# Patient Record
Sex: FEMALE | Race: WHITE | NOT HISPANIC OR LATINO | Employment: OTHER | URBAN - METROPOLITAN AREA
[De-identification: names, ages, dates, MRNs, and addresses within clinical notes are randomized per-mention and may not be internally consistent; named-entity substitution may affect disease eponyms.]

---

## 2023-02-27 LAB
ALANINE AMINOTRANSFERASE (SGPT) (U/L) IN SER/PLAS: 28 U/L (ref 7–45)
ALBUMIN (G/DL) IN SER/PLAS: 4.1 G/DL (ref 3.4–5)
ALKALINE PHOSPHATASE (U/L) IN SER/PLAS: 46 U/L (ref 33–136)
ANION GAP IN SER/PLAS: 15 MMOL/L (ref 10–20)
ASPARTATE AMINOTRANSFERASE (SGOT) (U/L) IN SER/PLAS: 17 U/L (ref 9–39)
BASOPHILS (10*3/UL) IN BLOOD BY AUTOMATED COUNT: 0.07 X10E9/L (ref 0–0.1)
BASOPHILS/100 LEUKOCYTES IN BLOOD BY AUTOMATED COUNT: 0.7 % (ref 0–2)
BILIRUBIN TOTAL (MG/DL) IN SER/PLAS: 0.9 MG/DL (ref 0–1.2)
CALCIUM (MG/DL) IN SER/PLAS: 9.6 MG/DL (ref 8.6–10.6)
CARBON DIOXIDE, TOTAL (MMOL/L) IN SER/PLAS: 26 MMOL/L (ref 21–32)
CHLORIDE (MMOL/L) IN SER/PLAS: 103 MMOL/L (ref 98–107)
CREATININE (MG/DL) IN SER/PLAS: 0.83 MG/DL (ref 0.5–1.05)
EOSINOPHILS (10*3/UL) IN BLOOD BY AUTOMATED COUNT: 0.16 X10E9/L (ref 0–0.7)
EOSINOPHILS/100 LEUKOCYTES IN BLOOD BY AUTOMATED COUNT: 1.6 % (ref 0–6)
ERYTHROCYTE DISTRIBUTION WIDTH (RATIO) BY AUTOMATED COUNT: 13.6 % (ref 11.5–14.5)
ERYTHROCYTE MEAN CORPUSCULAR HEMOGLOBIN CONCENTRATION (G/DL) BY AUTOMATED: 31.5 G/DL (ref 32–36)
ERYTHROCYTE MEAN CORPUSCULAR VOLUME (FL) BY AUTOMATED COUNT: 98 FL (ref 80–100)
ERYTHROCYTES (10*6/UL) IN BLOOD BY AUTOMATED COUNT: 4.28 X10E12/L (ref 4–5.2)
GFR FEMALE: 76 ML/MIN/1.73M2
GLUCOSE (MG/DL) IN SER/PLAS: 97 MG/DL (ref 74–99)
HEMATOCRIT (%) IN BLOOD BY AUTOMATED COUNT: 41.9 % (ref 36–46)
HEMOGLOBIN (G/DL) IN BLOOD: 13.2 G/DL (ref 12–16)
IMMATURE GRANULOCYTES/100 LEUKOCYTES IN BLOOD BY AUTOMATED COUNT: 0.4 % (ref 0–0.9)
LEUKOCYTES (10*3/UL) IN BLOOD BY AUTOMATED COUNT: 9.9 X10E9/L (ref 4.4–11.3)
LYMPHOCYTES (10*3/UL) IN BLOOD BY AUTOMATED COUNT: 2.67 X10E9/L (ref 1.2–4.8)
LYMPHOCYTES/100 LEUKOCYTES IN BLOOD BY AUTOMATED COUNT: 27.1 % (ref 13–44)
MONOCYTES (10*3/UL) IN BLOOD BY AUTOMATED COUNT: 0.69 X10E9/L (ref 0.1–1)
MONOCYTES/100 LEUKOCYTES IN BLOOD BY AUTOMATED COUNT: 7 % (ref 2–10)
NATRIURETIC PEPTIDE B (PG/ML) IN SER/PLAS: 176 PG/ML (ref 0–99)
NEUTROPHILS (10*3/UL) IN BLOOD BY AUTOMATED COUNT: 6.24 X10E9/L (ref 1.2–7.7)
NEUTROPHILS/100 LEUKOCYTES IN BLOOD BY AUTOMATED COUNT: 63.2 % (ref 40–80)
NRBC (PER 100 WBCS) BY AUTOMATED COUNT: 0 /100 WBC (ref 0–0)
PLATELETS (10*3/UL) IN BLOOD AUTOMATED COUNT: 208 X10E9/L (ref 150–450)
POTASSIUM (MMOL/L) IN SER/PLAS: 3.9 MMOL/L (ref 3.5–5.3)
PROTEIN TOTAL: 6.7 G/DL (ref 6.4–8.2)
SODIUM (MMOL/L) IN SER/PLAS: 140 MMOL/L (ref 136–145)
UREA NITROGEN (MG/DL) IN SER/PLAS: 12 MG/DL (ref 6–23)

## 2023-04-05 LAB
ANION GAP IN SER/PLAS: 14 MMOL/L (ref 10–20)
CALCIUM (MG/DL) IN SER/PLAS: 10.4 MG/DL (ref 8.6–10.6)
CARBON DIOXIDE, TOTAL (MMOL/L) IN SER/PLAS: 29 MMOL/L (ref 21–32)
CHLORIDE (MMOL/L) IN SER/PLAS: 103 MMOL/L (ref 98–107)
CREATININE (MG/DL) IN SER/PLAS: 0.91 MG/DL (ref 0.5–1.05)
ERYTHROCYTE DISTRIBUTION WIDTH (RATIO) BY AUTOMATED COUNT: 13.3 % (ref 11.5–14.5)
ERYTHROCYTE MEAN CORPUSCULAR HEMOGLOBIN CONCENTRATION (G/DL) BY AUTOMATED: 32.2 G/DL (ref 32–36)
ERYTHROCYTE MEAN CORPUSCULAR VOLUME (FL) BY AUTOMATED COUNT: 96 FL (ref 80–100)
ERYTHROCYTES (10*6/UL) IN BLOOD BY AUTOMATED COUNT: 4.35 X10E12/L (ref 4–5.2)
GFR FEMALE: 68 ML/MIN/1.73M2
GLUCOSE (MG/DL) IN SER/PLAS: 115 MG/DL (ref 74–99)
HEMATOCRIT (%) IN BLOOD BY AUTOMATED COUNT: 41.9 % (ref 36–46)
HEMOGLOBIN (G/DL) IN BLOOD: 13.5 G/DL (ref 12–16)
INR IN PPP BY COAGULATION ASSAY: 0.9 (ref 0.9–1.1)
LEUKOCYTES (10*3/UL) IN BLOOD BY AUTOMATED COUNT: 7 X10E9/L (ref 4.4–11.3)
NRBC (PER 100 WBCS) BY AUTOMATED COUNT: 0 /100 WBC (ref 0–0)
PLATELETS (10*3/UL) IN BLOOD AUTOMATED COUNT: 151 X10E9/L (ref 150–450)
POTASSIUM (MMOL/L) IN SER/PLAS: 4.5 MMOL/L (ref 3.5–5.3)
PROTHROMBIN TIME (PT) IN PPP BY COAGULATION ASSAY: 10.5 SEC (ref 9.8–13.4)
SODIUM (MMOL/L) IN SER/PLAS: 141 MMOL/L (ref 136–145)
UREA NITROGEN (MG/DL) IN SER/PLAS: 16 MG/DL (ref 6–23)

## 2023-04-11 ENCOUNTER — HOSPITAL ENCOUNTER (OUTPATIENT)
Dept: DATA CONVERSION | Facility: HOSPITAL | Age: 68
End: 2023-04-11
Attending: INTERNAL MEDICINE | Admitting: INTERNAL MEDICINE
Payer: MEDICARE

## 2023-04-11 DIAGNOSIS — I05.0 RHEUMATIC MITRAL STENOSIS: ICD-10-CM

## 2023-04-11 DIAGNOSIS — I34.2 NONRHEUMATIC MITRAL (VALVE) STENOSIS: ICD-10-CM

## 2023-04-11 DIAGNOSIS — E78.5 HYPERLIPIDEMIA, UNSPECIFIED: ICD-10-CM

## 2023-04-11 DIAGNOSIS — I25.10 ATHEROSCLEROTIC HEART DISEASE OF NATIVE CORONARY ARTERY WITHOUT ANGINA PECTORIS: ICD-10-CM

## 2023-04-11 DIAGNOSIS — J44.9 CHRONIC OBSTRUCTIVE PULMONARY DISEASE, UNSPECIFIED (MULTI): ICD-10-CM

## 2023-04-11 DIAGNOSIS — F17.200 NICOTINE DEPENDENCE, UNSPECIFIED, UNCOMPLICATED: ICD-10-CM

## 2023-04-17 LAB
ACTIVATED PARTIAL THROMBOPLASTIN TIME IN PPP BY COAGULATION ASSAY: 29 SEC (ref 26–39)
ANION GAP IN SER/PLAS: 11 MMOL/L (ref 10–20)
APPEARANCE, URINE: ABNORMAL
BACTERIA, URINE: ABNORMAL /HPF
BASOPHILS (10*3/UL) IN BLOOD BY AUTOMATED COUNT: 0.05 X10E9/L (ref 0–0.1)
BASOPHILS/100 LEUKOCYTES IN BLOOD BY AUTOMATED COUNT: 0.8 % (ref 0–2)
BILIRUBIN, URINE: NEGATIVE
BLOOD, URINE: NEGATIVE
C REACTIVE PROTEIN (MG/L) IN SER/PLAS: 0.14 MG/DL
CALCIUM (MG/DL) IN SER/PLAS: 9.2 MG/DL (ref 8.6–10.3)
CARBON DIOXIDE, TOTAL (MMOL/L) IN SER/PLAS: 30 MMOL/L (ref 21–32)
CHLORIDE (MMOL/L) IN SER/PLAS: 101 MMOL/L (ref 98–107)
COLOR, URINE: YELLOW
CREATININE (MG/DL) IN SER/PLAS: 0.88 MG/DL (ref 0.5–1.05)
EOSINOPHILS (10*3/UL) IN BLOOD BY AUTOMATED COUNT: 0.17 X10E9/L (ref 0–0.7)
EOSINOPHILS/100 LEUKOCYTES IN BLOOD BY AUTOMATED COUNT: 2.7 % (ref 0–6)
ERYTHROCYTE DISTRIBUTION WIDTH (RATIO) BY AUTOMATED COUNT: 13.6 % (ref 11.5–14.5)
ERYTHROCYTE MEAN CORPUSCULAR HEMOGLOBIN CONCENTRATION (G/DL) BY AUTOMATED: 32.1 G/DL (ref 32–36)
ERYTHROCYTE MEAN CORPUSCULAR VOLUME (FL) BY AUTOMATED COUNT: 98 FL (ref 80–100)
ERYTHROCYTES (10*6/UL) IN BLOOD BY AUTOMATED COUNT: 4.18 X10E12/L (ref 4–5.2)
GFR FEMALE: 71 ML/MIN/1.73M2
GLUCOSE (MG/DL) IN SER/PLAS: 87 MG/DL (ref 74–99)
GLUCOSE, URINE: NEGATIVE MG/DL
HEMATOCRIT (%) IN BLOOD BY AUTOMATED COUNT: 40.8 % (ref 36–46)
HEMOGLOBIN (G/DL) IN BLOOD: 13.1 G/DL (ref 12–16)
HYALINE CASTS, URINE: ABNORMAL /LPF
IMMATURE GRANULOCYTES/100 LEUKOCYTES IN BLOOD BY AUTOMATED COUNT: 0.3 % (ref 0–0.9)
INR IN PPP BY COAGULATION ASSAY: 1.1 (ref 0.9–1.1)
KETONES, URINE: ABNORMAL MG/DL
LEUKOCYTE ESTERASE, URINE: ABNORMAL
LEUKOCYTES (10*3/UL) IN BLOOD BY AUTOMATED COUNT: 6.3 X10E9/L (ref 4.4–11.3)
LYMPHOCYTES (10*3/UL) IN BLOOD BY AUTOMATED COUNT: 1.56 X10E9/L (ref 1.2–4.8)
LYMPHOCYTES/100 LEUKOCYTES IN BLOOD BY AUTOMATED COUNT: 24.9 % (ref 13–44)
MONOCYTES (10*3/UL) IN BLOOD BY AUTOMATED COUNT: 0.53 X10E9/L (ref 0.1–1)
MONOCYTES/100 LEUKOCYTES IN BLOOD BY AUTOMATED COUNT: 8.5 % (ref 2–10)
MUCUS, URINE: ABNORMAL /LPF
NEUTROPHILS (10*3/UL) IN BLOOD BY AUTOMATED COUNT: 3.93 X10E9/L (ref 1.2–7.7)
NEUTROPHILS/100 LEUKOCYTES IN BLOOD BY AUTOMATED COUNT: 62.8 % (ref 40–80)
NITRITE, URINE: NEGATIVE
PH, URINE: 5 (ref 5–8)
PLATELETS (10*3/UL) IN BLOOD AUTOMATED COUNT: 181 X10E9/L (ref 150–450)
POTASSIUM (MMOL/L) IN SER/PLAS: 4.4 MMOL/L (ref 3.5–5.3)
PROTEIN, URINE: NEGATIVE MG/DL
PROTHROMBIN TIME (PT) IN PPP BY COAGULATION ASSAY: 12.4 SEC (ref 9.8–13.4)
RBC, URINE: 1 /HPF (ref 0–5)
SODIUM (MMOL/L) IN SER/PLAS: 138 MMOL/L (ref 136–145)
SPECIFIC GRAVITY, URINE: 1.02 (ref 1–1.03)
SQUAMOUS EPITHELIAL CELLS, URINE: 3 /HPF
UREA NITROGEN (MG/DL) IN SER/PLAS: 11 MG/DL (ref 6–23)
UROBILINOGEN, URINE: <2 MG/DL (ref 0–1.9)
WBC, URINE: 3 /HPF (ref 0–5)

## 2023-04-19 LAB
STAPH/MRSA SCREEN, CULTURE: NORMAL
URINE CULTURE: NORMAL

## 2023-05-10 ENCOUNTER — PATIENT OUTREACH (OUTPATIENT)
Dept: CARE COORDINATION | Facility: CLINIC | Age: 68
End: 2023-05-10
Payer: MEDICARE

## 2023-05-12 LAB
INR IN PPP BY COAGULATION ASSAY: 1.4 (ref 0.9–1.1)
PROTHROMBIN TIME (PT) IN PPP BY COAGULATION ASSAY: 15.9 SEC (ref 9.8–13.4)

## 2023-09-07 VITALS — BODY MASS INDEX: 29.84 KG/M2 | HEIGHT: 63 IN | WEIGHT: 168.43 LBS

## 2023-09-14 NOTE — H&P
History & Physical Reviewed:   I have reviewed the History and Physical dated:  03-Apr-2023   History and Physical reviewed and relevant findings noted. Patient examined to review pertinent physical  findings.: No significant changes   Home Medications Reviewed: no changes noted   Allergies Reviewed: no changes noted       Airway/Sedation Assessment:  ·  Emotional Status calm   ·  Neurologic alert & oriented x 3   ·  Respiratory clear to auscultation   ·  Cardiovascular rhythm & rate regular   ·  GI/ soft, nontender     · Pulses present: Pedal Left, Pedal Right, Radial Left, Radial Right    AS [facility] phys assess pulse FT Diminished pedal pulses +1     ·  Mouth Opening OK yes   ·  Neck Flexibility OK yes   ·  Loose Teeth no   ·  Oropharyngeal Classification Class III   ·  ASA PS Classification ASA III   ·  Sedation Plan moderate sedation       ERAS (Enhanced Recovery After Surgery):  ·  ERAS Patient: no     Consent:   COVID-19 Consent:  ·  COVID-19 Risk Consent Surgeon has reviewed key risks related to the risk of alfie COVID-19 and if they contract COVID-19 what the risks are.     Assessment/Plan:   ·  Assessment and Plan    67 Year old female with Mitral valve disease presents for right and Left heart Cath with Dr Miles.      Electronic Signatures:  Karishma Zayas (PAC)  (Signed 11-Apr-2023 07:40)   Authored: History & Physical Reviewed, Airway/Sedation,  ERAS, Consent, Assessment/Plan, Note Completion      Last Updated: 11-Apr-2023 07:40 by Karishma Zayas (PAC)

## 2023-11-29 ENCOUNTER — CLINICAL SUPPORT (OUTPATIENT)
Dept: CARDIOLOGY | Facility: CLINIC | Age: 68
End: 2023-11-29
Payer: MEDICARE

## 2023-11-29 DIAGNOSIS — I34.2 NONRHEUMATIC MITRAL VALVE STENOSIS: ICD-10-CM

## 2023-11-29 DIAGNOSIS — R06.09 DOE (DYSPNEA ON EXERTION): ICD-10-CM

## 2023-11-29 DIAGNOSIS — I34.0 MITRAL VALVE INSUFFICIENCY, UNSPECIFIED ETIOLOGY: ICD-10-CM

## 2023-11-29 LAB
AORTIC VALVE MEAN GRADIENT: 8.4
AORTIC VALVE PEAK VELOCITY: 2
AV PEAK GRADIENT: 16.1
AVA (PEAK VEL): 1.64
AVA (VTI): 1.66
EJECTION FRACTION APICAL 4 CHAMBER: 58
EJECTION FRACTION: 56
LEFT ATRIUM VOLUME AREA LENGTH INDEX BSA: 43.5
LEFT VENTRICLE INTERNAL DIMENSION DIASTOLE: 3.99 (ref 3.5–6)
LEFT VENTRICULAR OUTFLOW TRACT DIAMETER: 1.69
MITRAL VALVE E/A RATIO: 1.09
MITRAL VALVE E/E' RATIO: 25.94
RIGHT VENTRICLE FREE WALL PEAK S': 10
RIGHT VENTRICLE PEAK SYSTOLIC PRESSURE: 27
TRICUSPID ANNULAR PLANE SYSTOLIC EXCURSION: 1.2

## 2023-11-29 PROCEDURE — 93306 TTE W/DOPPLER COMPLETE: CPT | Performed by: STUDENT IN AN ORGANIZED HEALTH CARE EDUCATION/TRAINING PROGRAM

## 2023-11-29 PROCEDURE — 93306 TTE W/DOPPLER COMPLETE: CPT

## 2023-12-13 ENCOUNTER — OFFICE VISIT (OUTPATIENT)
Dept: CARDIOLOGY | Facility: CLINIC | Age: 68
End: 2023-12-13
Payer: MEDICARE

## 2023-12-13 VITALS
DIASTOLIC BLOOD PRESSURE: 60 MMHG | SYSTOLIC BLOOD PRESSURE: 132 MMHG | OXYGEN SATURATION: 98 % | HEIGHT: 63 IN | HEART RATE: 107 BPM | WEIGHT: 171 LBS | BODY MASS INDEX: 30.3 KG/M2

## 2023-12-13 DIAGNOSIS — Z95.1 STATUS POST SINGLE VESSEL CORONARY ARTERY BYPASS: ICD-10-CM

## 2023-12-13 DIAGNOSIS — Z95.2 H/O PROSTHETIC MITRAL VALVE: ICD-10-CM

## 2023-12-13 DIAGNOSIS — I25.10 CORONARY ARTERY DISEASE INVOLVING NATIVE CORONARY ARTERY OF NATIVE HEART WITHOUT ANGINA PECTORIS: Primary | ICD-10-CM

## 2023-12-13 PROCEDURE — 1126F AMNT PAIN NOTED NONE PRSNT: CPT | Performed by: STUDENT IN AN ORGANIZED HEALTH CARE EDUCATION/TRAINING PROGRAM

## 2023-12-13 PROCEDURE — 99215 OFFICE O/P EST HI 40 MIN: CPT | Performed by: STUDENT IN AN ORGANIZED HEALTH CARE EDUCATION/TRAINING PROGRAM

## 2023-12-13 PROCEDURE — 1159F MED LIST DOCD IN RCRD: CPT | Performed by: STUDENT IN AN ORGANIZED HEALTH CARE EDUCATION/TRAINING PROGRAM

## 2023-12-13 RX ORDER — ROSUVASTATIN CALCIUM 40 MG/1
40 TABLET, COATED ORAL DAILY
COMMUNITY
Start: 2023-05-05 | End: 2024-04-30

## 2023-12-13 RX ORDER — AMIODARONE HYDROCHLORIDE 400 MG/1
400 TABLET ORAL DAILY
COMMUNITY
Start: 2023-05-06 | End: 2023-12-13 | Stop reason: ALTCHOICE

## 2023-12-13 RX ORDER — METOPROLOL SUCCINATE 25 MG/1
25 TABLET, EXTENDED RELEASE ORAL DAILY
COMMUNITY
Start: 2023-05-26 | End: 2024-03-21 | Stop reason: DRUGHIGH

## 2023-12-13 RX ORDER — LEVOTHYROXINE SODIUM 25 UG/1
25 TABLET ORAL DAILY
COMMUNITY
End: 2024-04-18 | Stop reason: SDUPTHER

## 2023-12-13 RX ORDER — NAPROXEN SODIUM 220 MG/1
81 TABLET, FILM COATED ORAL DAILY
COMMUNITY
Start: 2023-05-11

## 2023-12-13 ASSESSMENT — ENCOUNTER SYMPTOMS
DEPRESSION: 0
LOSS OF SENSATION IN FEET: 0
OCCASIONAL FEELINGS OF UNSTEADINESS: 0

## 2023-12-13 ASSESSMENT — PAIN SCALES - GENERAL: PAINLEVEL: 0-NO PAIN

## 2023-12-13 NOTE — PROGRESS NOTES
Follow-up (5 month fuv. Echo results. )     HPI:    Elena Patrick is a 68 y.o. female with pertinent history of hypothyroidism, cochlear implant, tobacco abuse, likely underlying coronary artery with coronary calcium score of 507 on CT calcium score performed 6/16/2020, preserved ejection fraction with severe mitral stenosis with peak gradient of 15 mmHg, severe left atrial enlargement with mild to moderate mitral regurgitation, mild to moderate pulmonary hypertension with RVSP of 43 mmHg on echo performed 12/6/2022, moderate 70% mid LAD lesion, pulmonary artery pressure of 35 to 24 mmHg, pulmonary wedge pressure of 24 mmHg, LVEDP of 14 mmHg, severe mitral stenosis and cardiac catheterization performed 4/11/2023, status post Mitral valve replacement #29 Epic, CABG LIMA to LAD, PFO closure, LA atrial appendage atrial clip, preserved ejection fraction with restrictive diastolic dysfunction, moderate left atrial enlargement, bioprosthetic mitral valve with 5 mm gradient, mildly elevated RVSP on echo performed 5/5/2023, preserved LVEF, impaired relaxation, mildly reduced right ventricular function, moderate to severe left atrial enlargement, left atrial pended clip, moderate mitral annular calcification, prosthetic #29 epic mitral valve with gradients of 16.4/7.8 millimeters of mercury with an overall reduction RVSP on echo performed 11/29/2023 presents to cardiology clinic for follow-up.     She is doing relatively well.  Cardiac rehab is done well and she is completed.  She has had no significant chest discomfort.  Dyspnea on exertion is stable.  We reviewed and discussed her recent echocardiogram.  No exacerbating or relieving factors.  Patient denies chest pain and angina.  Pt denies orthopnea, and paroxysmal nocturnal dyspnea.  Pt denies worsening lower extremity edema.  Pt denies palpitations or syncope.  No recent falls.  No fever or chills.  No cough.  No change in bowel or bladder habits.  No sick contacts.   No recent travel.    12 point review of systems including (Constitutional, Eyes, ENMT, Respiratory, Cardiac, Gastrointestinal, Neurological, Psychiatric, and Hematologic) was performed and is otherwise negative.    Past medical history reviewed:   has a past medical history of Personal history of (healed) traumatic fracture (09/23/2021), Personal history of other diseases of the musculoskeletal system and connective tissue (11/01/2019), Personal history of other endocrine, nutritional and metabolic disease (11/01/2019), and Personal history of other infectious and parasitic diseases (11/01/2019).    Past surgical history reviewed:   has a past surgical history that includes Other surgical history (11/01/2019).    Social history reviewed:   reports that she quit smoking about 11 months ago. Her smoking use included cigarettes. She has never used smokeless tobacco. No history on file for alcohol use and drug use.     Family history:  No sudden cardiac death or premature coronary artery disease.     Allergies reviewed: Azithromycin, Codeine, Penicillins, Apixaban, Atorvastatin, Erythromycin, Prednisone, and Clindamycin     Medications reviewed:   Current Outpatient Medications   Medication Instructions    aspirin (ASPIRIN CHILDRENS) 81 mg, oral, Daily    levothyroxine (SYNTHROID, LEVOXYL) 25 mcg, oral, Daily    metoprolol succinate XL (TOPROL-XL) 25 mg, oral, Daily    MULTIVITAMIN ORAL 1 tablet, oral, Daily RT    rosuvastatin (CRESTOR) 40 mg, oral, Daily        Vitals reviewed: Visit Vitals  /60 (Patient Position: Sitting)   Pulse 107       Physical Exam:   General:  Patient is awake, alert, and oriented.  Patient is in no acute distress.  HEENT:  Pupils equal and reactive.  Normocephalic.  Moist mucosa.    Neck:  No thyromegaly.  Normal Jugular Venous Pressure.  Cardiovascular:  Regular rate and rhythm.  Normal S1 and S2.  1/6 TRICIA.  Midline scar.  Pulmonary:  Clear to auscultation bilaterally.  Abdomen:  Soft.  Non-tender.   Non-distended.  Positive bowel sounds.  Lower Extremities:  2+ pedal pulses. No LE edema.  Neurologic:  Cranial nerves intact.  No focal deficit.   Skin: Skin warm and dry, normal skin turgor.   Psychiatric: Normal affect.    Last Labs:  CBC -      Lab Results   Component Value Date    WBC 10.1 05/10/2023    HGB 8.8 (L) 05/10/2023    HCT 27.6 (L) 05/10/2023     05/10/2023        CMP-  Lab Results   Component Value Date    GLUCOSE 148 (H) 05/10/2023     (L) 05/10/2023    K 3.3 (L) 05/10/2023    CL 96 (L) 05/10/2023    CO2 28 05/10/2023    ANIONGAP 14 05/10/2023    BUN 10 05/10/2023    CREATININE 0.90 05/10/2023    CALCIUM 9.2 05/10/2023    PHOS 2.7 05/06/2023    PROT 6.3 (L) 05/10/2023    ALBUMIN 3.7 05/10/2023    AST 48 (H) 05/10/2023     (H) 05/10/2023    ALKPHOS 69 05/10/2023    BILITOT 0.6 05/10/2023        LIPIDS-  Lab Results   Component Value Date    CHOL 128 06/24/2020    TRIG 199 (H) 06/24/2020    HDL 49.9 06/24/2020    CHHDL 2.6 06/24/2020    VLDL 40 06/24/2020        OTHERS-  Lab Results   Component Value Date    HGBA1C 5.6 06/22/2023     (H) 02/27/2023        I personally reviewed the patient's recent vitals, labs, medications, orders, EKGs, pertinent cardiac imaging/ echocardiography and ischemic evaluations including stress testing/ cardiac catheterization.    Assessment and Plan:    Elena Patrick is a 68 y.o. female with pertinent history of hypothyroidism, cochlear implant, tobacco abuse, likely underlying coronary artery with coronary calcium score of 507 on CT calcium score performed 6/16/2020, preserved ejection fraction with severe mitral stenosis with peak gradient of 15 mmHg, severe left atrial enlargement with mild to moderate mitral regurgitation, mild to moderate pulmonary hypertension with RVSP of 43 mmHg on echo performed 12/6/2022, moderate 70% mid LAD lesion, pulmonary artery pressure of 35 to 24 mmHg, pulmonary wedge pressure of 24 mmHg, LVEDP  of 14 mmHg, severe mitral stenosis and cardiac catheterization performed 4/11/2023, status post Mitral valve replacement #29 Epic, CABG LIMA to LAD, PFO closure, LA atrial appendage atrial clip, preserved ejection fraction with restrictive diastolic dysfunction, moderate left atrial enlargement, bioprosthetic mitral valve with 5 mm gradient, mildly elevated RVSP on echo performed 5/5/2023, preserved LVEF, impaired relaxation, mildly reduced right ventricular function, moderate to severe left atrial enlargement, left atrial pended clip, moderate mitral annular calcification, prosthetic #29 epic mitral valve with gradients of 16.4/7.8 millimeters of mercury with an overall reduction RVSP on echo performed 11/29/2023 presents to cardiology clinic for follow-up. She is doing relatively well.  Cardiac rehab is done well and she is completed.  She has had no significant chest discomfort.  Dyspnea on exertion is stable.  We reviewed and discussed her recent echocardiogram.      Please continue current cardiac medications including aspirin 81 mg, Toprol succinate 25 mg daily, rosuvastatin 40 mg daily.      Please followup with me in Cardiology clinic within the next 7 months.  Please return to clinic sooner or seek emergent care if your symptoms reoccur or worsen.    Thank you for allowing me to participate in their care.  Please feel free to call me with any further questions or concerns.        Ben Martinez MD, FACC, STEFANIE WILEY  Division of Cardiovascular Medicine  Medical Director, [facility]  [facility]  Assistant Clinical Professor, Medicine  [facility] [employer] [facility] [employer] [facility] [employer]  [email or web address]  Office:  [phone]

## 2023-12-13 NOTE — PATIENT INSTRUCTIONS
Please continue current cardiac medications including aspirin 81 mg, Toprol succinate 25 mg daily, rosuvastatin 40 mg daily.      Please followup with me in Cardiology clinic within the next 7 months.  Please return to clinic sooner or seek emergent care if your symptoms reoccur or worsen.

## 2024-03-21 ENCOUNTER — OFFICE VISIT (OUTPATIENT)
Dept: CARDIOLOGY | Facility: CLINIC | Age: 69
End: 2024-03-21
Payer: MEDICARE

## 2024-03-21 ENCOUNTER — ANCILLARY PROCEDURE (OUTPATIENT)
Dept: CARDIOLOGY | Facility: CLINIC | Age: 69
End: 2024-03-21
Payer: MEDICARE

## 2024-03-21 VITALS
BODY MASS INDEX: 29.77 KG/M2 | WEIGHT: 168 LBS | SYSTOLIC BLOOD PRESSURE: 126 MMHG | HEIGHT: 63 IN | HEART RATE: 77 BPM | DIASTOLIC BLOOD PRESSURE: 64 MMHG

## 2024-03-21 DIAGNOSIS — R06.09 DOE (DYSPNEA ON EXERTION): ICD-10-CM

## 2024-03-21 DIAGNOSIS — I34.0 MITRAL VALVE INSUFFICIENCY, UNSPECIFIED ETIOLOGY: ICD-10-CM

## 2024-03-21 DIAGNOSIS — Z95.2 H/O PROSTHETIC MITRAL VALVE: ICD-10-CM

## 2024-03-21 DIAGNOSIS — R00.2 PALPITATIONS: Primary | ICD-10-CM

## 2024-03-21 DIAGNOSIS — I34.2 NONRHEUMATIC MITRAL VALVE STENOSIS: ICD-10-CM

## 2024-03-21 DIAGNOSIS — R00.2 PALPITATIONS: ICD-10-CM

## 2024-03-21 DIAGNOSIS — I25.10 CORONARY ARTERY DISEASE INVOLVING NATIVE CORONARY ARTERY OF NATIVE HEART WITHOUT ANGINA PECTORIS: ICD-10-CM

## 2024-03-21 DIAGNOSIS — Z95.1 STATUS POST SINGLE VESSEL CORONARY ARTERY BYPASS: ICD-10-CM

## 2024-03-21 PROCEDURE — 1159F MED LIST DOCD IN RCRD: CPT | Performed by: STUDENT IN AN ORGANIZED HEALTH CARE EDUCATION/TRAINING PROGRAM

## 2024-03-21 PROCEDURE — 93247 EXT ECG>7D<15D SCAN A/R: CPT

## 2024-03-21 PROCEDURE — 93248 EXT ECG>7D<15D REV&INTERPJ: CPT | Performed by: INTERNAL MEDICINE

## 2024-03-21 PROCEDURE — 99215 OFFICE O/P EST HI 40 MIN: CPT | Performed by: STUDENT IN AN ORGANIZED HEALTH CARE EDUCATION/TRAINING PROGRAM

## 2024-03-21 PROCEDURE — 1036F TOBACCO NON-USER: CPT | Performed by: STUDENT IN AN ORGANIZED HEALTH CARE EDUCATION/TRAINING PROGRAM

## 2024-03-21 RX ORDER — METOPROLOL SUCCINATE 50 MG/1
50 TABLET, EXTENDED RELEASE ORAL DAILY
Qty: 90 TABLET | Refills: 3 | Status: SHIPPED | OUTPATIENT
Start: 2024-03-21 | End: 2024-03-28 | Stop reason: DRUGHIGH

## 2024-03-21 NOTE — PROGRESS NOTES
HPI:    Elena Patrick is a 68 y.o. female with pertinent history of hypothyroidism, cochlear implant, tobacco abuse, likely underlying coronary artery with coronary calcium score of 507 on CT calcium score performed 6/16/2020, preserved ejection fraction with severe mitral stenosis with peak gradient of 15 mmHg, severe left atrial enlargement with mild to moderate mitral regurgitation, mild to moderate pulmonary hypertension with RVSP of 43 mmHg on echo performed 12/6/2022, moderate 70% mid LAD lesion, pulmonary artery pressure of 35 to 24 mmHg, pulmonary wedge pressure of 24 mmHg, LVEDP of 14 mmHg, severe mitral stenosis and cardiac catheterization performed 4/11/2023, status post Mitral valve replacement #29 Epic, CABG LIMA to LAD, PFO closure, LA atrial appendage atrial clip, preserved ejection fraction with restrictive diastolic dysfunction, moderate left atrial enlargement, bioprosthetic mitral valve with 5 mm gradient, mildly elevated RVSP on echo performed 5/5/2023, preserved LVEF, impaired relaxation, mildly reduced right ventricular function, moderate to severe left atrial enlargement, left atrial pended clip, moderate mitral annular calcification, prosthetic #29 epic mitral valve with gradients of 16.4/7.8 millimeters of mercury with an overall reduction RVSP on echo performed 11/29/2023 presents to cardiology clinic for follow-up.     She is doing relatively well but does notice significant increase in palpitations.  They can occur at rest or with stress and have increased a bit in frequency especially since surgery.  She has had no significant chest discomfort.  Dyspnea on exertion is stable.  We reviewed and discussed her recent echocardiogram.  No exacerbating or relieving factors.  Patient denies chest pain and angina.  Pt denies orthopnea, and paroxysmal nocturnal dyspnea.  Pt denies worsening lower extremity edema.  Pt denies syncope.  No recent falls.  No fever or chills.  No cough.  No  change in bowel or bladder habits.  No sick contacts.  No recent travel.    12 point review of systems including (Constitutional, Eyes, ENMT, Respiratory, Cardiac, Gastrointestinal, Neurological, Psychiatric, and Hematologic) was performed and is otherwise negative.    Past medical history reviewed:   has a past medical history of Personal history of (healed) traumatic fracture (09/23/2021), Personal history of other diseases of the musculoskeletal system and connective tissue (11/01/2019), Personal history of other endocrine, nutritional and metabolic disease (11/01/2019), and Personal history of other infectious and parasitic diseases (11/01/2019).    Past surgical history reviewed:   has a past surgical history that includes Other surgical history (11/01/2019).    Social history reviewed:   reports that she quit smoking about 14 months ago. Her smoking use included cigarettes. She has never used smokeless tobacco. No history on file for alcohol use and drug use.     Family history:  No sudden cardiac death or premature coronary artery disease.     Allergies reviewed: Azithromycin, Codeine, Penicillins, Apixaban, Atorvastatin, Erythromycin, Prednisone, and Clindamycin     Medications reviewed:   Current Outpatient Medications   Medication Instructions    aspirin (ASPIRIN CHILDRENS) 81 mg, oral, Daily    levothyroxine (SYNTHROID, LEVOXYL) 25 mcg, oral, Daily    metoprolol succinate XL (TOPROL-XL) 25 mg, oral, Daily    MULTIVITAMIN ORAL 1 tablet, oral, Daily RT    rosuvastatin (CRESTOR) 40 mg, oral, Daily        Vitals reviewed: Visit Vitals  /64   Pulse 77       Physical Exam:   General:  Patient is awake, alert, and oriented.  Patient is in no acute distress.  HEENT:  Pupils equal and reactive.  Normocephalic.  Moist mucosa.    Neck:  No thyromegaly.  Normal Jugular Venous Pressure.  Cardiovascular:  Regular rate and rhythm.  Normal S1 and S2.  1/6 TRICIA.  Midline scar.  Pulmonary:  Clear to auscultation  bilaterally.  Abdomen:  Soft. Non-tender.   Non-distended.  Positive bowel sounds.  Lower Extremities:  2+ pedal pulses. No LE edema.  Neurologic:  Cranial nerves intact.  No focal deficit.   Skin: Skin warm and dry, normal skin turgor.   Psychiatric: Normal affect.    Last Labs:  CBC -      Lab Results   Component Value Date    WBC 10.1 05/10/2023    HGB 8.8 (L) 05/10/2023    HCT 27.6 (L) 05/10/2023     05/10/2023        CMP-  Lab Results   Component Value Date    GLUCOSE 148 (H) 05/10/2023     (L) 05/10/2023    K 3.3 (L) 05/10/2023    CL 96 (L) 05/10/2023    CO2 28 05/10/2023    ANIONGAP 14 05/10/2023    BUN 10 05/10/2023    CREATININE 0.90 05/10/2023    CALCIUM 9.2 05/10/2023    PHOS 2.7 05/06/2023    PROT 6.3 (L) 05/10/2023    ALBUMIN 3.7 05/10/2023    AST 48 (H) 05/10/2023     (H) 05/10/2023    ALKPHOS 69 05/10/2023    BILITOT 0.6 05/10/2023        LIPIDS-  Lab Results   Component Value Date    CHOL 128 06/24/2020    TRIG 199 (H) 06/24/2020    HDL 49.9 06/24/2020    CHHDL 2.6 06/24/2020    VLDL 40 06/24/2020        OTHERS-  Lab Results   Component Value Date    HGBA1C 5.6 06/22/2023     (H) 02/27/2023        I personally reviewed the patient's recent vitals, labs, medications, orders, EKGs, pertinent cardiac imaging/ echocardiography and ischemic evaluations including stress testing/ cardiac catheterization.    Assessment and Plan:    Elena Patrick is a 68 y.o. female with pertinent history of hypothyroidism, cochlear implant, tobacco abuse, likely underlying coronary artery with coronary calcium score of 507 on CT calcium score performed 6/16/2020, preserved ejection fraction with severe mitral stenosis with peak gradient of 15 mmHg, severe left atrial enlargement with mild to moderate mitral regurgitation, mild to moderate pulmonary hypertension with RVSP of 43 mmHg on echo performed 12/6/2022, moderate 70% mid LAD lesion, pulmonary artery pressure of 35 to 24 mmHg, pulmonary  wedge pressure of 24 mmHg, LVEDP of 14 mmHg, severe mitral stenosis and cardiac catheterization performed 4/11/2023, status post Mitral valve replacement #29 Epic, CABG LIMA to LAD, PFO closure, LA atrial appendage atrial clip, preserved ejection fraction with restrictive diastolic dysfunction, moderate left atrial enlargement, bioprosthetic mitral valve with 5 mm gradient, mildly elevated RVSP on echo performed 5/5/2023, preserved LVEF, impaired relaxation, mildly reduced right ventricular function, moderate to severe left atrial enlargement, left atrial pended clip, moderate mitral annular calcification, prosthetic #29 epic mitral valve with gradients of 16.4/7.8 millimeters of mercury with an overall reduction RVSP on echo performed 11/29/2023 presents to cardiology clinic for follow-up. She is doing relatively well but does notice significant increase in palpitations.  They can occur at rest or with stress and have increased a bit in frequency especially since surgery.  She has had no significant chest discomfort.  Dyspnea on exertion is stable.  We reviewed and discussed her recent echocardiogram.     Given the patient's symptoms and in order to further evaluate possible arrhythmias, we will plan to perform an event monitor.    Will increase metoprolol succinate from 25 mg daily to 50 mg daily.    Please continue current cardiac medications including aspirin 81 mg, rosuvastatin 40 mg daily.      Please followup with me in Cardiology clinic within the next 2 months.  Please return to clinic sooner or seek emergent care if your symptoms reoccur or worsen.    Thank you for allowing me to participate in their care.  Please feel free to call me with any further questions or concerns.        Ben Martinez MD, FACANGELIA, STEFANIE WILEY  Division of Cardiovascular Medicine  Medical Director, [facility]  [facility]  Assistant Clinical Professor,  Medicine  [employer]  [email or web address]  Office:  [phone]

## 2024-03-21 NOTE — PATIENT INSTRUCTIONS
We will plan to perform an event monitor.    Will increase metoprolol succinate from 25 mg daily to 50 mg daily.    Please continue current cardiac medications including aspirin 81 mg, rosuvastatin 40 mg daily.      Please followup with me in Cardiology clinic within the next 2 months.  Please return to clinic sooner or seek emergent care if your symptoms reoccur or worsen.

## 2024-03-28 ENCOUNTER — OFFICE VISIT (OUTPATIENT)
Dept: CARDIOLOGY | Facility: CLINIC | Age: 69
End: 2024-03-28
Payer: MEDICARE

## 2024-03-28 VITALS
BODY MASS INDEX: 30.3 KG/M2 | OXYGEN SATURATION: 98 % | HEIGHT: 63 IN | DIASTOLIC BLOOD PRESSURE: 80 MMHG | WEIGHT: 171 LBS | HEART RATE: 73 BPM | SYSTOLIC BLOOD PRESSURE: 138 MMHG

## 2024-03-28 DIAGNOSIS — I25.10 CORONARY ARTERY DISEASE INVOLVING NATIVE CORONARY ARTERY OF NATIVE HEART WITHOUT ANGINA PECTORIS: ICD-10-CM

## 2024-03-28 DIAGNOSIS — Z95.2 H/O PROSTHETIC MITRAL VALVE: ICD-10-CM

## 2024-03-28 DIAGNOSIS — R06.09 DOE (DYSPNEA ON EXERTION): ICD-10-CM

## 2024-03-28 DIAGNOSIS — Z95.1 STATUS POST SINGLE VESSEL CORONARY ARTERY BYPASS: ICD-10-CM

## 2024-03-28 DIAGNOSIS — I34.2 NONRHEUMATIC MITRAL VALVE STENOSIS: ICD-10-CM

## 2024-03-28 DIAGNOSIS — I34.0 MITRAL VALVE INSUFFICIENCY, UNSPECIFIED ETIOLOGY: ICD-10-CM

## 2024-03-28 DIAGNOSIS — R00.2 PALPITATIONS: Primary | ICD-10-CM

## 2024-03-28 PROCEDURE — 1159F MED LIST DOCD IN RCRD: CPT | Performed by: STUDENT IN AN ORGANIZED HEALTH CARE EDUCATION/TRAINING PROGRAM

## 2024-03-28 PROCEDURE — 99214 OFFICE O/P EST MOD 30 MIN: CPT | Performed by: STUDENT IN AN ORGANIZED HEALTH CARE EDUCATION/TRAINING PROGRAM

## 2024-03-28 RX ORDER — METOPROLOL SUCCINATE 25 MG/1
25 TABLET, EXTENDED RELEASE ORAL DAILY
Qty: 90 TABLET | Refills: 3 | Status: SHIPPED | OUTPATIENT
Start: 2024-03-28 | End: 2024-04-30

## 2024-03-28 NOTE — PROGRESS NOTES
HPI:    Elena Patrick is a 68 y.o. female with pertinent history of hypothyroidism, cochlear implant, tobacco abuse, likely underlying coronary artery with coronary calcium score of 507 on CT calcium score performed 6/16/2020, preserved ejection fraction with severe mitral stenosis with peak gradient of 15 mmHg, severe left atrial enlargement with mild to moderate mitral regurgitation, mild to moderate pulmonary hypertension with RVSP of 43 mmHg on echo performed 12/6/2022, moderate 70% mid LAD lesion, pulmonary artery pressure of 35 to 24 mmHg, pulmonary wedge pressure of 24 mmHg, LVEDP of 14 mmHg, severe mitral stenosis and cardiac catheterization performed 4/11/2023, status post Mitral valve replacement #29 Epic, CABG LIMA to LAD, PFO closure, LA atrial appendage atrial clip, preserved ejection fraction with restrictive diastolic dysfunction, moderate left atrial enlargement, bioprosthetic mitral valve with 5 mm gradient, mildly elevated RVSP on echo performed 5/5/2023, preserved LVEF, impaired relaxation, mildly reduced right ventricular function, moderate to severe left atrial enlargement, left atrial pended clip, moderate mitral annular calcification, prosthetic #29 epic mitral valve with gradients of 16.4/7.8 millimeters of mercury with an overall reduction RVSP on echo performed 11/29/2023 presents to cardiology clinic for follow-up.     She is doing relatively well but felt extremely fatigued and increased shortness of breath on increased dose of metoprolol succinate 50 mg.  She has subsequently gone back down on the dose of 25 mg and is stable.  Palpitations may be slightly improved but she is still wearing her event monitor.   Dyspnea on exertion is stable.  We reviewed and discussed her recent echocardiogram.  No exacerbating or relieving factors.  Patient denies chest pain and angina.  Pt denies orthopnea, and paroxysmal nocturnal dyspnea.  Pt denies worsening lower extremity edema.  Pt denies  syncope.  No recent falls.  No fever or chills.  No cough.  No change in bowel or bladder habits.  No sick contacts.  No recent travel.    12 point review of systems including (Constitutional, Eyes, ENMT, Respiratory, Cardiac, Gastrointestinal, Neurological, Psychiatric, and Hematologic) was performed and is otherwise negative.    Past medical history reviewed:   has a past medical history of Personal history of (healed) traumatic fracture (09/23/2021), Personal history of other diseases of the musculoskeletal system and connective tissue (11/01/2019), Personal history of other endocrine, nutritional and metabolic disease (11/01/2019), and Personal history of other infectious and parasitic diseases (11/01/2019).    Past surgical history reviewed:   has a past surgical history that includes Other surgical history (11/01/2019).    Social history reviewed:   reports that she quit smoking about 14 months ago. Her smoking use included cigarettes. She has never used smokeless tobacco. No history on file for alcohol use and drug use.     Family history:  No sudden cardiac death or premature coronary artery disease.     Allergies reviewed: Azithromycin, Codeine, Penicillins, Apixaban, Atorvastatin, Erythromycin, Prednisone, and Clindamycin     Medications reviewed:   Current Outpatient Medications   Medication Instructions    aspirin (ASPIRIN CHILDRENS) 81 mg, oral, Daily    levothyroxine (SYNTHROID, LEVOXYL) 25 mcg, oral, Daily    metoprolol succinate XL (TOPROL-XL) 50 mg, oral, Daily    MULTIVITAMIN ORAL 1 tablet, oral, Daily RT    rosuvastatin (CRESTOR) 40 mg, oral, Daily        Vitals reviewed: Visit Vitals  /80   Pulse 73       Physical Exam:   General:  Patient is awake, alert, and oriented.  Patient is in no acute distress.  HEENT:  Pupils equal and reactive.  Normocephalic.  Moist mucosa.    Neck:  No thyromegaly.  Normal Jugular Venous Pressure.  Cardiovascular:  Regular rate and rhythm.  Normal S1 and S2.   1/6 TRICIA.  Midline scar.  Pulmonary:  Clear to auscultation bilaterally.  Abdomen:  Soft. Non-tender.   Non-distended.  Positive bowel sounds.  Lower Extremities:  2+ pedal pulses. No LE edema.  Neurologic:  Cranial nerves intact.  No focal deficit.   Skin: Skin warm and dry, normal skin turgor.   Psychiatric: Normal affect.    Last Labs:  CBC -      Lab Results   Component Value Date    WBC 10.1 05/10/2023    HGB 8.8 (L) 05/10/2023    HCT 27.6 (L) 05/10/2023     05/10/2023        CMP-  Lab Results   Component Value Date    GLUCOSE 148 (H) 05/10/2023     (L) 05/10/2023    K 3.3 (L) 05/10/2023    CL 96 (L) 05/10/2023    CO2 28 05/10/2023    ANIONGAP 14 05/10/2023    BUN 10 05/10/2023    CREATININE 0.90 05/10/2023    CALCIUM 9.2 05/10/2023    PHOS 2.7 05/06/2023    PROT 6.3 (L) 05/10/2023    ALBUMIN 3.7 05/10/2023    AST 48 (H) 05/10/2023     (H) 05/10/2023    ALKPHOS 69 05/10/2023    BILITOT 0.6 05/10/2023        LIPIDS-  Lab Results   Component Value Date    CHOL 128 06/24/2020    TRIG 199 (H) 06/24/2020    HDL 49.9 06/24/2020    CHHDL 2.6 06/24/2020    VLDL 40 06/24/2020        OTHERS-  Lab Results   Component Value Date    HGBA1C 5.6 06/22/2023     (H) 02/27/2023        I personally reviewed the patient's recent vitals, labs, medications, orders, EKGs, pertinent cardiac imaging/ echocardiography and ischemic evaluations including stress testing/ cardiac catheterization.    Assessment and Plan:    Elena Patrick is a 68 y.o. female with pertinent history of hypothyroidism, cochlear implant, tobacco abuse, likely underlying coronary artery with coronary calcium score of 507 on CT calcium score performed 6/16/2020, preserved ejection fraction with severe mitral stenosis with peak gradient of 15 mmHg, severe left atrial enlargement with mild to moderate mitral regurgitation, mild to moderate pulmonary hypertension with RVSP of 43 mmHg on echo performed 12/6/2022, moderate 70% mid LAD lesion,  pulmonary artery pressure of 35 to 24 mmHg, pulmonary wedge pressure of 24 mmHg, LVEDP of 14 mmHg, severe mitral stenosis and cardiac catheterization performed 4/11/2023, status post Mitral valve replacement #29 Epic, CABG LIMA to LAD, PFO closure, LA atrial appendage atrial clip, preserved ejection fraction with restrictive diastolic dysfunction, moderate left atrial enlargement, bioprosthetic mitral valve with 5 mm gradient, mildly elevated RVSP on echo performed 5/5/2023, preserved LVEF, impaired relaxation, mildly reduced right ventricular function, moderate to severe left atrial enlargement, left atrial pended clip, moderate mitral annular calcification, prosthetic #29 epic mitral valve with gradients of 16.4/7.8 millimeters of mercury with an overall reduction RVSP on echo performed 11/29/2023 presents to cardiology clinic for follow-up.  She is doing relatively well but felt extremely fatigued and increased shortness of breath on increased dose of metoprolol succinate 50 mg.  She has subsequently gone back down on the dose of 25 mg and is stable.  Palpitations may be slightly improved but she is still wearing her event monitor.       We will follow-up on your event monitor results.    Will reduce metoprolol succinate from 50 mg daily to 25 mg daily.    Please continue current cardiac medications including aspirin 81 mg, rosuvastatin 40 mg daily.      Please followup with me in Cardiology clinic as scheduled.  Please return to clinic sooner or seek emergent care if your symptoms reoccur or worsen.    Thank you for allowing me to participate in their care.  Please feel free to call me with any further questions or concerns.        Ben Martinez MD, FACC, STEFANIE WILEY  Division of Cardiovascular Medicine  System Director, Nuclear Cardiology   Medical Director, [facility] [place], [facility]   Clinical , [facility]  [facility]  [email or web address]   Office:  [phone]

## 2024-03-28 NOTE — PATIENT INSTRUCTIONS
We will follow-up on your event monitor results.    Will reduce metoprolol succinate from 50 mg daily to 25 mg daily.    Please continue current cardiac medications including aspirin 81 mg, rosuvastatin 40 mg daily.      Please followup with me in Cardiology clinic as scheduled.  Please return to clinic sooner or seek emergent care if your symptoms reoccur or worsen.

## 2024-04-03 NOTE — PROGRESS NOTES
"ADULT AUDIOMETRIC EVALUATION      Name:  Elena Patrick  :  1955  Age:  68 y.o.  Date of Evaluation:  2024    IMPRESSIONS     Today's test results are consistent with severe to profound SNHL in the LE and profound SNHL in the RE. Discussed results and recommendations with patient.  Questions were addressed and the patient was encouraged to contact our department should concerns arise.    RECOMMENDATIONS     Continue medical follow up with PCP/ENT.  Return for evaluation following any medical management.     Time: 8319-2797    HISTORY     Elena Patrick is seen today in conjunction with ENT appointment.  Patient reported history of left ear cochlear implantation in . Most recently managed by [facility], however would like to establish care at . Currently wears a MED-Youjia processor which she perceives as helpful however notes she utilizes a combination of auditory and visual cues (lipreading) for understanding. She has \"no hearing\" in her right ear at this time and does not use amplification on that side. Most recently she perceives her hearing diminished since having open heart surgery in May 2023. She also has intermittent bothersome tinnitus described as a \"hum\" which can come with tinny/echoes in voices. Previous history of imbalance, however no reported recent falls.    TEST RESULTS     Otoscopic Evaluation:  Physical exam to evaluate the outer ear  Right Ear: Clear ear canal.  Left Ear: Clear ear canal.    Tympanometry & Acoustic Reflexes:  Assesses the function of the middle ear and inner ear structures  Right Ear: Tympanometry revealed normal ear canal volume, peak pressure, and compliance, consistent with normal middle ear function (Type A). Ipsilateral Acoustic Reflexes were absent at 500-4000 Hz.   Left Ear: Tympanometry revealed normal ear canal volume, peak pressure, and compliance, consistent with normal middle ear function (Type A). Ipsilateral Acoustic Reflexes were absent at " 500-4000 Hz.     Distortion Product Otoacoustic Emissions: Assesses the cochlear outer hair cell function (4863-0257 Hz frequency range)  DNT.    Pure Tone Audiometry:  Conventional Audiometry via inserts with good reliability  Right Ear: Profound SNHL.  Left Ear: Severe to profound SNHL.    Speech Audiometry:   Right Ear:  Speech Awareness Threshold (SAT) was unable to be obtained (no response at limits of equipment).  Left Ear:  Speech Awareness Threshold (SAT) was obtained at 100 dBHL.      Aided Audiometry:  All testing was completed in the soundfield at 0 degrees azimuth. Pure tone testing was obtained using pulsed frequency modulating (FM) stimuli, and SRT was obtained using monitored live voice (MLV). Sentence and word recognition testing was performed with recorded material at 70 dBSPL.    Left CI: Word Recognition scores were 32% using Nu-6 word list.      Testing and interpretation of results completed David Campos CCC-A CCVR. It was my pleasure to evaluate this patient.       DAVID Campos, CCC-A CCVR  Senior Clinical Vestibular Audiologist    Degree of Hearing Sensitivity Decibel Range   Within Normal Limits (WNL) 0-25   Mild 26-40   Moderate 41-55   Moderately-Severe 56-70   Severe 71-90   Profound 91+      Key   CNT/DNT Could Not Test/Did Not Test   TM Tympanic Membrane   WNL Within Normal Limits   HA Hearing Aid   SNHL Sensorineural Hearing Loss   CHL Conductive Hearing Loss   NIHL Noise-Induced Hearing Loss   ECV Ear Canal Volume   RE/LE Right Ear/Left Ear        AUDIOGRAM

## 2024-04-09 ENCOUNTER — OFFICE VISIT (OUTPATIENT)
Dept: OTOLARYNGOLOGY | Facility: CLINIC | Age: 69
End: 2024-04-09
Payer: MEDICARE

## 2024-04-09 ENCOUNTER — CLINICAL SUPPORT (OUTPATIENT)
Dept: AUDIOLOGY | Facility: CLINIC | Age: 69
End: 2024-04-09
Payer: MEDICARE

## 2024-04-09 VITALS — HEIGHT: 63 IN | WEIGHT: 168 LBS | BODY MASS INDEX: 29.77 KG/M2

## 2024-04-09 DIAGNOSIS — Z96.21 COCHLEAR IMPLANT IN PLACE: Primary | ICD-10-CM

## 2024-04-09 DIAGNOSIS — H90.3 SENSORINEURAL HEARING LOSS (SNHL), BILATERAL: ICD-10-CM

## 2024-04-09 DIAGNOSIS — H90.3 SENSORINEURAL HEARING LOSS (SNHL), BILATERAL: Primary | ICD-10-CM

## 2024-04-09 PROCEDURE — 99203 OFFICE O/P NEW LOW 30 MIN: CPT | Performed by: OTOLARYNGOLOGY

## 2024-04-09 PROCEDURE — 92557 COMPREHENSIVE HEARING TEST: CPT

## 2024-04-09 PROCEDURE — 1159F MED LIST DOCD IN RCRD: CPT | Performed by: OTOLARYNGOLOGY

## 2024-04-09 PROCEDURE — 1160F RVW MEDS BY RX/DR IN RCRD: CPT | Performed by: OTOLARYNGOLOGY

## 2024-04-09 PROCEDURE — 92550 TYMPANOMETRY & REFLEX THRESH: CPT

## 2024-04-09 ASSESSMENT — PATIENT HEALTH QUESTIONNAIRE - PHQ9
1. LITTLE INTEREST OR PLEASURE IN DOING THINGS: NOT AT ALL
SUM OF ALL RESPONSES TO PHQ9 QUESTIONS 1 AND 2: 0
2. FEELING DOWN, DEPRESSED OR HOPELESS: NOT AT ALL

## 2024-04-09 NOTE — PROGRESS NOTES
History Of Present Illness:  Elena Patrick is a 68 y.o. female whom presents to me as a new patient to establish care for bilateral sensorineural hearing loss and left-sided cochlear implantation.     She underwent a left-sided cochlear implant (MED-EL) on 3/2/2006 with Dr. Andre Ceron at [facility]. Because there was a discontinuation of the external processors, she transitioned to using the Sonic external processor device in 2018. At that point, she did notice a decrease in her hearing, but was still able to function. However, in 2023 she had open heart surgery which triggered another drop in her hearing and also triggered buzzing non-pulsatile tinnitus on the implanted side. While she does have a history of vertigo and imbalance issues for many years, it is currently under control and is not associated with her tinnitus. She only has some mild imbalance, worse when she looks up at the areli. She continues to have profound sensorineural hearing loss on the right side, but is not open to undergoing a CI placement on that side, because she suffered from severe vertigo immediately after her left CI placement in 2006.      Past Medical History:  She has a past medical history of Personal history of (healed) traumatic fracture (09/23/2021), Personal history of other diseases of the musculoskeletal system and connective tissue (11/01/2019), Personal history of other endocrine, nutritional and metabolic disease (11/01/2019), and Personal history of other infectious and parasitic diseases (11/01/2019).    Surgical History:  She has a past surgical history that includes Other surgical history (11/01/2019).     Social History:  She reports that she quit smoking about 14 months ago. Her smoking use included cigarettes. She has never used smokeless tobacco. No history on file for alcohol use and drug use.    Family History:  No family history on file.     Medications:  Current Outpatient Medications   Medication Instructions     aspirin (ASPIRIN CHILDRENS) 81 mg, oral, Daily    levothyroxine (SYNTHROID, LEVOXYL) 25 mcg, oral, Daily    metoprolol succinate XL (TOPROL-XL) 25 mg, oral, Daily, Patient takes 25mg    MULTIVITAMIN ORAL 1 tablet, oral, Daily RT    rosuvastatin (CRESTOR) 40 mg, oral, Daily      Allergies:  Azithromycin, Codeine, Penicillins, Apixaban, Atorvastatin, Erythromycin, Prednisone, and Clindamycin    Review of Systems:   A comprehensive 10-point review of systems was obtained including constitutional, neurological, HEENT, pulmonary, cardiovascular, genito-urinary, and other pertinent systems and was negative except as noted in the HPI.      Physical Exam:  Constitutional   General appearance: Healthy-appearing, well-nourished, well groomed, in no acute distress.   Ability to communicate: Normal communication without aids, normal voice quality.       Head and face: Atraumatic with no masses, lesions, or scarring.   Facial strength: Normal strength and symmetry, no synkinesis or facial tic.     Ears  Otoscopic examination: Bilateral normal otoscopy of the tympanic membrane, mild dry cerumen burden in the right EAC that was suctioned out     Nose: Dorsum symmetric with no visible or palpable deformities.     Oral Cavity/Mouth  Lips, teeth, and gums: Normal lips, gums, and dentition.     Oropharynx: Mucosa moist, no lesions.     Neck: Symmetrical, trachea midline. No masses visible.     Neurological/Psychiatric  Cranial Nerve Examination: II - XII grossly intact.  Orientation to person, place, and time: Normal.  Mood and affect: Normal.     Skin: Normal without rashes or lesions.     Pulmonary  Respiratory effort: Chest expands symmetrically.     Cardiovascular: Good peripheral pulses  Peripheral vascular system: No varicosities, carotid pulse normal, no edema. No jugular venous distension.     Extremities: Appearance of extremities: Normal. Gait normal.       Last Recorded Vitals:  There were no vitals taken for this  visit.    Relevant Results:  Audiogram 4/9/2024  Moderate SNHL in the left ear with the CI  Profound sensorineural hearing loss in the right ear  Could not test for word recognition  Type A tymps bilaterally       Assessment/Plan   68 y.o. female whom presents to me as a new patient for bilateral sensorineural hearing loss and cochlear implant malfunction after open heart surgery last year.    -We explained to the patient that it is highly likely that the combination of having to go on a bypass pump and possible use of monopolar cautery did contribute to the decrease in hearing on her implanted side. It also likely triggered her tinnitus. We will plan to continue to monitor and to make efforts to adjust her settings such that her hearing is optimized. We also discussed the option placing an implant on the right side and she declined given her past history of severe post-op vertigo after her first implant.     Vani Henriquez MD  Department of Otolaryngology-Head and Neck Surgery  PGY-4    I saw and evaluated the patient. I personally obtained the key and critical portions of the history and physical exam or was physically present for key and critical portions performed by the resident/fellow. I reviewed the resident/fellow's documentation and discussed the patient with the resident/fellow. I agree with the resident/fellow's medical decision making as documented in the note.    Lizabeth Crain MD

## 2024-04-09 NOTE — PATIENT INSTRUCTIONS
Welcome to Dr. Crain's clinic. We are here to assist you through your ENT care at [facility].  Dr. Crain is an Ear surgeon. This means that she specializes in taking care of patients with complex ear problems.     Dr. Crain's office number is [phone]. While you may see her at a satellite office, she has a team committed to help meet your healthcare needs at [facility]. This number is the most direct way to communicate with the office.     Stephy is Dr. Crain's  and she answers the office phone from 8am-4pm Mon-Fri. She can help you with many general questions and information. Questions that she cannot answer will be directed to the appropriate staff. You may need to leave a message. In this case, someone from the team will call you back.     Zackary is Dr. Crain's primary nurse and can be reached by calling the office. Zackary is in clinic with Dr. Crain's on Mondays and Tuesdays. Non-urgent calls will be returned on non-clinic days typically Thursdays.     Sometimes, other team members will also be involved in your care. These people may include dieticians, social workers, speech therapists, audiologist, neurologist, and physical therapist. Dr. Crain will provide these referrals as needed. Please let her know if you would like to request a specific referral.     For your convenience, Dr. Crain sees patients at several [facility] locations including [facility] and [facility] at the main campus of [facility]. While we try to make your appointments as convenient as possible, occasionally a visit to another location may be necessary to provide the best care for you. We look forward to working with you to meet your healthcare goals.     Dr. Crain makes every effort to run on time for your appointments. Therefore, if you are more than 30 minutes late unrelated to a scan or another appointment such therapy or audio you will have to reschedule.

## 2024-04-09 NOTE — LETTER
April 10, 2024     Bryson Momin DO  [id number] [facility] [id number]    Patient: Elena Patrick   YOB: 1955   Date of Visit: 4/9/2024       Dear Dr. Bryson Momin DO:    I had the pleasure of seeing your patient, Elena Patrick today. Below are my notes for this consultation.  If you have questions, please do not hesitate to call me. Thank you for allowing me to participate in the care of your patient.        Sincerely,     Lizabeth Crain MD      CC: No Recipients  _____________________________________________________________________________    68 y.o. female whom presents to me as a new patient for bilateral sensorineural hearing loss and cochlear implant malfunction after open heart surgery last year.    -We explained to the patient that it is highly likely that the combination of having to go on a bypass pump and possible use of monopolar cautery did contribute to the decrease in hearing on her implanted side. It also likely triggered her tinnitus. We will plan to continue to monitor and to make efforts to adjust her settings such that her hearing is optimized. We also discussed the option placing an implant on the right side and she declined given her past history of severe post-op vertigo after her first implant.     Vani Henriquez MD  Department of Otolaryngology-Head and Neck Surgery  PGY-4    I saw and evaluated the patient. I personally obtained the key and critical portions of the history and physical exam or was physically present for key and critical portions performed by the resident/fellow. I reviewed the resident/fellow's documentation and discussed the patient with the resident/fellow. I agree with the resident/fellow's medical decision making as documented in the note.    Lizabeth Crain MD

## 2024-04-10 PROBLEM — Z96.21 COCHLEAR IMPLANT IN PLACE: Status: ACTIVE | Noted: 2024-04-10

## 2024-04-10 PROBLEM — H90.3 SENSORINEURAL HEARING LOSS (SNHL), BILATERAL: Status: ACTIVE | Noted: 2024-04-10

## 2024-04-10 LAB — BODY SURFACE AREA: 1.84 M2

## 2024-04-17 ENCOUNTER — OFFICE VISIT (OUTPATIENT)
Dept: PRIMARY CARE | Facility: CLINIC | Age: 69
End: 2024-04-17
Payer: MEDICARE

## 2024-04-17 VITALS
SYSTOLIC BLOOD PRESSURE: 124 MMHG | HEART RATE: 84 BPM | WEIGHT: 173 LBS | HEIGHT: 63 IN | BODY MASS INDEX: 30.65 KG/M2 | DIASTOLIC BLOOD PRESSURE: 59 MMHG

## 2024-04-17 DIAGNOSIS — E03.9 HYPOTHYROIDISM, UNSPECIFIED TYPE: ICD-10-CM

## 2024-04-17 DIAGNOSIS — R73.01 ELEVATED FASTING GLUCOSE: ICD-10-CM

## 2024-04-17 DIAGNOSIS — J44.9 CHRONIC OBSTRUCTIVE PULMONARY DISEASE, UNSPECIFIED COPD TYPE (MULTI): ICD-10-CM

## 2024-04-17 DIAGNOSIS — I05.0 MITRAL VALVE STENOSIS, SEVERE: Chronic | ICD-10-CM

## 2024-04-17 DIAGNOSIS — I25.10 CALCIFICATION OF CORONARY ARTERY: ICD-10-CM

## 2024-04-17 DIAGNOSIS — I27.20 PULMONARY HYPERTENSION (MULTI): ICD-10-CM

## 2024-04-17 DIAGNOSIS — I50.33 ACUTE ON CHRONIC DIASTOLIC CONGESTIVE HEART FAILURE (MULTI): ICD-10-CM

## 2024-04-17 DIAGNOSIS — I25.84 CALCIFICATION OF CORONARY ARTERY: ICD-10-CM

## 2024-04-17 DIAGNOSIS — R21 RASH OF BACK: Primary | ICD-10-CM

## 2024-04-17 PROBLEM — Z86.39 HISTORY OF THYROID DISORDER: Status: ACTIVE | Noted: 2024-04-17

## 2024-04-17 PROBLEM — Q21.12 PATENT FORAMEN OVALE (HHS-HCC): Status: ACTIVE | Noted: 2023-05-01

## 2024-04-17 PROBLEM — R07.9 CHEST PAIN: Status: RESOLVED | Noted: 2023-05-02 | Resolved: 2024-04-17

## 2024-04-17 PROBLEM — I34.2 NONRHEUMATIC MITRAL VALVE STENOSIS: Status: ACTIVE | Noted: 2023-05-01

## 2024-04-17 PROBLEM — F17.200 NICOTINE DEPENDENCE: Status: RESOLVED | Noted: 2024-04-17 | Resolved: 2024-04-17

## 2024-04-17 PROBLEM — F17.200 NICOTINE DEPENDENCE: Status: ACTIVE | Noted: 2024-04-17

## 2024-04-17 PROBLEM — J96.21 ACUTE ON CHRONIC RESPIRATORY FAILURE WITH HYPOXIA (MULTI): Status: RESOLVED | Noted: 2023-02-16 | Resolved: 2024-04-17

## 2024-04-17 PROBLEM — I10 ESSENTIAL HYPERTENSION: Status: ACTIVE | Noted: 2024-04-17

## 2024-04-17 PROBLEM — E55.9 VITAMIN D DEFICIENCY: Status: ACTIVE | Noted: 2024-04-17

## 2024-04-17 PROBLEM — R59.0 MEDIASTINAL LYMPHADENOPATHY: Status: ACTIVE | Noted: 2024-04-17

## 2024-04-17 PROBLEM — E78.5 DYSLIPIDEMIA: Status: ACTIVE | Noted: 2024-04-17

## 2024-04-17 PROBLEM — R19.5 OCCULT BLOOD IN STOOLS: Status: RESOLVED | Noted: 2024-04-17 | Resolved: 2024-04-17

## 2024-04-17 PROBLEM — R68.3 CLUBBING OF NAILS: Status: ACTIVE | Noted: 2024-04-17

## 2024-04-17 PROBLEM — M25.559 ARTHRALGIA OF HIP: Status: ACTIVE | Noted: 2024-04-17

## 2024-04-17 PROBLEM — I34.0 MITRAL REGURGITATION: Status: ACTIVE | Noted: 2024-04-17

## 2024-04-17 PROBLEM — J96.21 ACUTE ON CHRONIC RESPIRATORY FAILURE WITH HYPOXIA (MULTI): Status: ACTIVE | Noted: 2023-02-16

## 2024-04-17 PROBLEM — R91.1 LUNG NODULE: Status: ACTIVE | Noted: 2024-04-17

## 2024-04-17 PROBLEM — R06.09 DOE (DYSPNEA ON EXERTION): Status: RESOLVED | Noted: 2024-04-17 | Resolved: 2024-04-17

## 2024-04-17 PROBLEM — K44.9 HIATAL HERNIA: Status: ACTIVE | Noted: 2024-04-17

## 2024-04-17 PROBLEM — R07.9 CHEST PAIN: Status: ACTIVE | Noted: 2023-05-02

## 2024-04-17 PROBLEM — R06.09 DOE (DYSPNEA ON EXERTION): Status: ACTIVE | Noted: 2024-04-17

## 2024-04-17 PROBLEM — R19.5 OCCULT BLOOD IN STOOLS: Status: ACTIVE | Noted: 2024-04-17

## 2024-04-17 PROBLEM — R00.2 PALPITATIONS: Status: ACTIVE | Noted: 2024-04-17

## 2024-04-17 LAB — TSH SERPL-ACNC: 2.04 MIU/L (ref 0.44–3.98)

## 2024-04-17 PROCEDURE — 1159F MED LIST DOCD IN RCRD: CPT | Performed by: NURSE PRACTITIONER

## 2024-04-17 PROCEDURE — 3074F SYST BP LT 130 MM HG: CPT | Performed by: NURSE PRACTITIONER

## 2024-04-17 PROCEDURE — 83036 HEMOGLOBIN GLYCOSYLATED A1C: CPT

## 2024-04-17 PROCEDURE — 1036F TOBACCO NON-USER: CPT | Performed by: NURSE PRACTITIONER

## 2024-04-17 PROCEDURE — 99214 OFFICE O/P EST MOD 30 MIN: CPT | Performed by: NURSE PRACTITIONER

## 2024-04-17 PROCEDURE — 84443 ASSAY THYROID STIM HORMONE: CPT

## 2024-04-17 PROCEDURE — 36415 COLL VENOUS BLD VENIPUNCTURE: CPT

## 2024-04-17 PROCEDURE — 3078F DIAST BP <80 MM HG: CPT | Performed by: NURSE PRACTITIONER

## 2024-04-17 PROCEDURE — 1160F RVW MEDS BY RX/DR IN RCRD: CPT | Performed by: NURSE PRACTITIONER

## 2024-04-17 RX ORDER — TRIAMCINOLONE ACETONIDE 1 MG/G
CREAM TOPICAL
Qty: 15 G | Refills: 0 | Status: SHIPPED | OUTPATIENT
Start: 2024-04-17

## 2024-04-17 RX ORDER — TRIAMCINOLONE ACETONIDE 1 MG/G
CREAM TOPICAL 2 TIMES DAILY
Qty: 15 G | Refills: 0 | Status: SHIPPED | OUTPATIENT
Start: 2024-04-17 | End: 2024-04-17

## 2024-04-17 ASSESSMENT — ENCOUNTER SYMPTOMS
OCCASIONAL FEELINGS OF UNSTEADINESS: 1
DEPRESSION: 0
LOSS OF SENSATION IN FEET: 0

## 2024-04-17 NOTE — PROGRESS NOTES
"Subjective   Reason for Visit: Elena Patrick is an 68 y.o. female here for a Medicare Wellness visit and chronic care.     Past Medical, Surgical, and Family History reviewed and updated in chart.    Reviewed all medications by prescribing practitioner or clinical pharmacist (such as prescriptions, OTCs, herbal therapies and supplements) and documented in the medical record.    HPI    1. CAD s/p CABG  2. HFpEF  3. Pulmonary hypertension  4. Mitral valve stenosis s/p MVR  5. Hypertension  Follows with cardiology, Dr. Ben Pedraza  Finished cardiac rehab in November 2023, she completed a total of 20 weeks  She walks 1.5-2 miles per day  Diet is healthy, does not consume caffeine and limits salt intake  Having some palpitations and had worn a holter monitor for 2 weeks, has a follow up appointment with Dr. Pedraza 5/21    5. COPD  Had smoked for about 50 years, 1/2 ppd day  Had chronic wheezing but this stopped after she quit smoking in winter 2023  Denies current shortness of breath, coughing, or wheezing    6. Hypothyroidism  Thought she would have lost weight with improved diet and regular physical activity  Can't get under 168 lbs   Reports hair thinning/loss, changes in bowel habits, depression, anxiety      Patient Care Team:  [facility] See-CNP as PCP - General (Family Medicine)     Review of Systems  ROS negative except as noted above in HPI.     Objective   Vitals:  /59   Pulse 84   Ht 1.6 m (5' 3\")   Wt 78.5 kg (173 lb)   BMI 30.65 kg/m²       Physical Exam  General: Alert and oriented, in no acute distress. Appears stated age, well-nourished, and well hydrated  HEENT:  - Head: Normocephalic and atraumatic   - Eyes: EOMI, PERRLA  - ENT: Hearing grossly intact  Heart: RRR. No murmurs, clicks, or rubs  Lungs: Unlabored breathing. CTAB with no crackles, wheezes, or rhonchi  Abdomen: Normal BS in all 4 quadrants. Soft, non-tender, non-distended, with no masses  Extremities: Warm and well " perfused. No edema. Normal peripheral pulses  Musculoskeletal: Normal gait and station  Neurological: Alert and oriented. No gross neurological deficits  Psychological: Appropriate mood and affect  Skin: No rash, abnormal lesions, cyanosis, or erythema    Assessment/Plan   Problem List Items Addressed This Visit    None

## 2024-04-17 NOTE — PROGRESS NOTES
Pt comes in bc she is switching back pcp. Pt was here a few years ago to see you. Pt had open heart surgery about a year ago. Pt has questions on some vaccines she may or may not need?

## 2024-04-17 NOTE — PROGRESS NOTES
"Subjective   Patient ID: Elena Patrick is a 68 y.o. female who presents for chronic care.     HPI     1. CAD s/p CABG  2. HFpEF  3. Pulmonary hypertension  4. Mitral valve stenosis s/p MVR  5. Hypertension  Follows with cardiology, Dr. Ben Pedraza  Checks her blood pressure at home multiple times per day, typically runs 110-120s/50-60s  Finished cardiac rehab in November 2023, she completed a total of 20 weeks  She walks 1.5-2 miles per day  Diet is healthy, does not consume caffeine and limits salt intake  Having some palpitations and had worn a holter monitor for 2 weeks, has a follow up appointment with Dr. Pedraza 5/21 to go over this    5. COPD  Had smoked for about 50 years, 1/2 ppd day  Had chronic wheezing but this stopped after she quit smoking in winter 2023  Denies current shortness of breath, coughing, or wheezing    6. Hypothyroidism  Thought she would have lost weight with improved diet and regular physical activity  Can't get under 168 lbs   Reports hair thinning/loss, changes in bowel habits, depression, anxiety    7. Skin concerns  Noticed an itchy, dry rash to her left low back about a month ago  Had been applying hydrocortisone cream which helped a little bit but it's still present    Review of Systems  ROS negative except as noted above in HPI.     Objective   /59   Pulse 84   Ht 1.6 m (5' 3\")   Wt 78.5 kg (173 lb)   BMI 30.65 kg/m²     Physical Exam  General: Alert and oriented, in no acute distress. Appears stated age, well-nourished, and well hydrated  HEENT:  - Head: Normocephalic and atraumatic   - Eyes: EOMI, PERRLA  - ENT: Hearing grossly intact  Heart: RRR. No murmurs, clicks, or rubs  Lungs: Unlabored breathing. CTAB with no crackles, wheezes, or rhonchi  Abdomen: Normal BS in all 4 quadrants. Soft, non-tender, non-distended, with no masses  Extremities: Warm and well perfused. No edema. Normal peripheral pulses  Musculoskeletal: Normal gait and station  Neurological: Alert " and oriented. No gross neurological deficits  Psychological: Appropriate mood and affect  Skin: Circular, dry scaly lesion to left low back. No rash, abnormal lesions, cyanosis, or erythema    Assessment/Plan   1. CAD s/p CABG  2. HFpEF  3. Pulmonary hypertension  4. Mitral valve stenosis s/p MVR  5. Hypertension  - Blood pressure well controlled  - Continue healthy diet and regular physical activity  - Following with cardiology, Dr. Martinez  - Continue ASA 81 mg every day, metoprolol 25 mg every day, rosuvastatin 40 mg every day    6. COPD  - Well controlled, asymptomatic  - Does not feel she needs an albuterol inhaler    7. Hypothyroidism  - Check TSH  - Continue levothyroxine 25 mcg every day, adjust dose pending labs    8. Rash  - Prescription sent for triamcinolone cream  - Referral placed for dermatology for skin check    9. Hx of prediabetes  - Check HgA1c    10. Vaccine need  - Advised due for Tdap, RSV, Shingrix --> will go to pharmacy for these    RTC in 1-2 months for Medicare Wellness Exam or sooner PHUC Soto-CNP  [facility]

## 2024-04-18 DIAGNOSIS — E03.9 HYPOTHYROIDISM, UNSPECIFIED TYPE: Primary | ICD-10-CM

## 2024-04-18 LAB
EST. AVERAGE GLUCOSE BLD GHB EST-MCNC: 111 MG/DL
HBA1C MFR BLD: 5.5 %

## 2024-04-18 RX ORDER — LEVOTHYROXINE SODIUM 25 UG/1
25 TABLET ORAL DAILY
Qty: 90 TABLET | Refills: 3 | Status: SHIPPED | OUTPATIENT
Start: 2024-04-18

## 2024-04-22 ASSESSMENT — DERMATOLOGY QUALITY OF LIFE (QOL) ASSESSMENT
WHAT SINGLE SKIN CONDITION LISTED BELOW IS THE PATIENT ANSWERING THE QUALITY-OF-LIFE ASSESSMENT QUESTIONS ABOUT: PSORIASIS
DATE THE QUALITY-OF-LIFE ASSESSMENT WAS COMPLETED: 66952
RATE HOW BOTHERED YOU ARE BY EFFECTS OF YOUR SKIN PROBLEMS ON YOUR ACTIVITIES (EG, GOING OUT, ACCOMPLISHING WHAT YOU WANT, WORK ACTIVITIES OR YOUR RELATIONSHIPS WITH OTHERS): 0 - NEVER BOTHERED
WHAT SINGLE SKIN CONDITION LISTED BELOW IS THE PATIENT ANSWERING THE QUALITY-OF-LIFE ASSESSMENT QUESTIONS ABOUT: PSORIASIS
RATE HOW EMOTIONALLY BOTHERED YOU ARE BY YOUR SKIN PROBLEM (FOR EXAMPLE, WORRY, EMBARRASSMENT, FRUSTRATION): 0 - NEVER BOTHERED
RATE HOW BOTHERED YOU ARE BY EFFECTS OF YOUR SKIN PROBLEMS ON YOUR ACTIVITIES (EG, GOING OUT, ACCOMPLISHING WHAT YOU WANT, WORK ACTIVITIES OR YOUR RELATIONSHIPS WITH OTHERS): 0 - NEVER BOTHERED
RATE HOW BOTHERED YOU ARE BY SYMPTOMS OF YOUR SKIN PROBLEM (EG, ITCHING, STINGING BURNING, HURTING OR SKIN IRRITATION): 0 - NEVER BOTHERED
DATE THE QUALITY-OF-LIFE ASSESSMENT WAS COMPLETED: 04/22/2024
RATE HOW EMOTIONALLY BOTHERED YOU ARE BY YOUR SKIN PROBLEM (FOR EXAMPLE, WORRY, EMBARRASSMENT, FRUSTRATION): 0 - NEVER BOTHERED
RATE HOW BOTHERED YOU ARE BY SYMPTOMS OF YOUR SKIN PROBLEM (EG, ITCHING, STINGING BURNING, HURTING OR SKIN IRRITATION): 0 - NEVER BOTHERED

## 2024-04-22 ASSESSMENT — PATIENT GLOBAL ASSESSMENT (PGA): WHAT IS THE PGA: PATIENT GLOBAL ASSESSMENT:  1 - CLEAR

## 2024-04-23 ENCOUNTER — OFFICE VISIT (OUTPATIENT)
Dept: DERMATOLOGY | Facility: CLINIC | Age: 69
End: 2024-04-23
Payer: MEDICARE

## 2024-04-23 DIAGNOSIS — L40.9 PSORIASIS: Primary | ICD-10-CM

## 2024-04-23 PROCEDURE — 1160F RVW MEDS BY RX/DR IN RCRD: CPT | Performed by: STUDENT IN AN ORGANIZED HEALTH CARE EDUCATION/TRAINING PROGRAM

## 2024-04-23 PROCEDURE — 99204 OFFICE O/P NEW MOD 45 MIN: CPT | Performed by: STUDENT IN AN ORGANIZED HEALTH CARE EDUCATION/TRAINING PROGRAM

## 2024-04-23 PROCEDURE — 1159F MED LIST DOCD IN RCRD: CPT | Performed by: STUDENT IN AN ORGANIZED HEALTH CARE EDUCATION/TRAINING PROGRAM

## 2024-04-23 RX ORDER — CALCIPOTRIENE 50 UG/G
OINTMENT TOPICAL
Qty: 60 G | Refills: 5 | Status: SHIPPED | OUTPATIENT
Start: 2024-04-23

## 2024-04-23 RX ORDER — CLOBETASOL PROPIONATE 0.5 MG/G
OINTMENT TOPICAL 2 TIMES DAILY
Qty: 60 G | Refills: 4 | Status: SHIPPED | OUTPATIENT
Start: 2024-04-23 | End: 2024-05-07

## 2024-04-23 NOTE — PROGRESS NOTES
Subjective     Elena Patrick is a 68 y.o. female who presents for the following: Rash (Back. Has had for about a month and a half. Denies itching and pain. Has tried Triamcinolone and OTC eczema cream. ).     Rash started after open heart surgery about 1 year ago. She has tried OTC eczema and psoriasis creams without improvement. Saw her PCP last Friday and started triamcinolone, not sure if it has helped. Not itchy or painful. Also noted smaller spots that pop up on her legs as well. No joint aches or pains or swelling, morning stiffness.     Review of Systems:  No other skin or systemic complaints other than what is documented elsewhere in the note.    The following portions of the chart were reviewed this encounter and updated as appropriate:         Skin Cancer History  No skin cancer on file.      Specialty Problems    None       Objective   Well appearing patient in no apparent distress; mood and affect are within normal limits.    A full examination was performed including scalp, head, eyes, ears, nose, lips, neck, chest, axillae, abdomen, back, buttocks, bilateral upper extremities, bilateral lower extremities, hands, feet, fingers, toes, fingernails, and toenails. All findings within normal limits unless otherwise noted below.    Assessment/Plan   1. Psoriasis  Left Lower Back, Left Thigh - Anterior, Right Knee - Anterior, Right Thigh - Anterior  Well-demarcated erythematous papules and plaques with overlying silvery scale on the left lower back, and small <5mm papules on the bilateral thighs and knees. Patient also has nail pitting bilaterally. Total BSA ~1%. No itch or symptoms.     Psoriasis - trunk, legs.  The potentially chronic and intermittently flaring nature of this condition and treatment options were discussed extensively with the patient today.    - At this time, I recommend topical steroid therapy with clobetasol, which the patient was instructed to apply twice daily to the affected areas  twice daily (avoid face, groin, body folds) for the next 2 weeks, followed by taper to twice daily on weekends only for persistent areas and/or maintenance and moisturization with a recommended over-the-counter moisturizing cream, such as Eucerin, or Vaseline twice daily on weekdays; the patient may repeat treatment in a 2-week burst-and-taper fashion every 6-8 weeks as needed for future flares.    - Patient was also instructed to start using calcipotriene cream twice a day as well in addition to clobetasol   - The risks, benefits, and side effects of this medication, including possible skin atrophy with overuse of topical steroids, were discussed.  The patient expressed understanding and is in agreement with this plan.  - Continue to monitor    Related Procedures  Follow Up In Dermatology - Established Patient    Related Medications  clobetasol (Temovate) 0.05 % ointment  Apply topically 2 times a day for 14 days. Use up to 2 weeks at a time for psoriasis, then take a 1-2 week break, repeat as needed    calcipotriene (Dovonex) 0.005 % ointment  Apply to affected areas twice daily for psoriasis when active as needed.    RTC 6 months to assess improvement  Tano Gallo MD PGY-4   Department of Dermatology     I was present during all key portions of visit including history, exam, discussion/plan and/or procedures and directly supervised our resident during all portions of the visit, follow up care, medications and more    Cuate Tervino MD

## 2024-04-28 DIAGNOSIS — I10 ESSENTIAL HYPERTENSION: Primary | ICD-10-CM

## 2024-04-28 DIAGNOSIS — E78.5 DYSLIPIDEMIA: ICD-10-CM

## 2024-04-29 ENCOUNTER — APPOINTMENT (OUTPATIENT)
Dept: AUDIOLOGY | Facility: CLINIC | Age: 69
End: 2024-04-29
Payer: MEDICARE

## 2024-04-29 DIAGNOSIS — H90.3 SENSORINEURAL HEARING LOSS (SNHL), BILATERAL: Primary | ICD-10-CM

## 2024-04-29 PROCEDURE — 92603 COCHLEAR IMPLT F/UP EXAM 7/>: CPT

## 2024-04-30 RX ORDER — ROSUVASTATIN CALCIUM 40 MG/1
40 TABLET, COATED ORAL DAILY
Qty: 90 TABLET | Refills: 3 | Status: SHIPPED | OUTPATIENT
Start: 2024-04-30

## 2024-04-30 RX ORDER — METOPROLOL SUCCINATE 25 MG/1
25 TABLET, EXTENDED RELEASE ORAL DAILY
Qty: 90 TABLET | Refills: 3 | Status: SHIPPED | OUTPATIENT
Start: 2024-04-30

## 2024-05-21 ENCOUNTER — OFFICE VISIT (OUTPATIENT)
Dept: CARDIOLOGY | Facility: CLINIC | Age: 69
End: 2024-05-21
Payer: MEDICARE

## 2024-05-21 VITALS
BODY MASS INDEX: 30.48 KG/M2 | WEIGHT: 172 LBS | HEIGHT: 63 IN | SYSTOLIC BLOOD PRESSURE: 142 MMHG | HEART RATE: 90 BPM | OXYGEN SATURATION: 97 % | RESPIRATION RATE: 20 BRPM | DIASTOLIC BLOOD PRESSURE: 74 MMHG

## 2024-05-21 DIAGNOSIS — I25.84 CALCIFICATION OF CORONARY ARTERY: ICD-10-CM

## 2024-05-21 DIAGNOSIS — Z95.2 H/O PROSTHETIC MITRAL VALVE: ICD-10-CM

## 2024-05-21 DIAGNOSIS — I05.0 MITRAL VALVE STENOSIS, SEVERE: Chronic | ICD-10-CM

## 2024-05-21 DIAGNOSIS — Z95.1 STATUS POST SINGLE VESSEL CORONARY ARTERY BYPASS: ICD-10-CM

## 2024-05-21 DIAGNOSIS — I34.2 NONRHEUMATIC MITRAL VALVE STENOSIS: ICD-10-CM

## 2024-05-21 DIAGNOSIS — I27.20 PULMONARY HYPERTENSION (MULTI): ICD-10-CM

## 2024-05-21 DIAGNOSIS — I10 ESSENTIAL HYPERTENSION: ICD-10-CM

## 2024-05-21 DIAGNOSIS — R00.2 PALPITATIONS: ICD-10-CM

## 2024-05-21 DIAGNOSIS — I34.0 MITRAL VALVE INSUFFICIENCY, UNSPECIFIED ETIOLOGY: ICD-10-CM

## 2024-05-21 DIAGNOSIS — R06.09 DOE (DYSPNEA ON EXERTION): ICD-10-CM

## 2024-05-21 DIAGNOSIS — I50.33 ACUTE ON CHRONIC DIASTOLIC CONGESTIVE HEART FAILURE (MULTI): ICD-10-CM

## 2024-05-21 DIAGNOSIS — I25.10 CORONARY ARTERY DISEASE INVOLVING NATIVE CORONARY ARTERY OF NATIVE HEART WITHOUT ANGINA PECTORIS: ICD-10-CM

## 2024-05-21 DIAGNOSIS — E78.5 DYSLIPIDEMIA: ICD-10-CM

## 2024-05-21 DIAGNOSIS — I25.10 CALCIFICATION OF CORONARY ARTERY: ICD-10-CM

## 2024-05-21 DIAGNOSIS — I47.10 PAROXYSMAL SUPRAVENTRICULAR TACHYCARDIA (CMS-HCC): Primary | ICD-10-CM

## 2024-05-21 PROCEDURE — 1160F RVW MEDS BY RX/DR IN RCRD: CPT | Performed by: STUDENT IN AN ORGANIZED HEALTH CARE EDUCATION/TRAINING PROGRAM

## 2024-05-21 PROCEDURE — 3078F DIAST BP <80 MM HG: CPT | Performed by: STUDENT IN AN ORGANIZED HEALTH CARE EDUCATION/TRAINING PROGRAM

## 2024-05-21 PROCEDURE — 1159F MED LIST DOCD IN RCRD: CPT | Performed by: STUDENT IN AN ORGANIZED HEALTH CARE EDUCATION/TRAINING PROGRAM

## 2024-05-21 PROCEDURE — 99214 OFFICE O/P EST MOD 30 MIN: CPT | Performed by: STUDENT IN AN ORGANIZED HEALTH CARE EDUCATION/TRAINING PROGRAM

## 2024-05-21 PROCEDURE — 3077F SYST BP >= 140 MM HG: CPT | Performed by: STUDENT IN AN ORGANIZED HEALTH CARE EDUCATION/TRAINING PROGRAM

## 2024-05-21 PROCEDURE — 1036F TOBACCO NON-USER: CPT | Performed by: STUDENT IN AN ORGANIZED HEALTH CARE EDUCATION/TRAINING PROGRAM

## 2024-05-21 ASSESSMENT — ENCOUNTER SYMPTOMS: DEPRESSION: 0

## 2024-05-21 NOTE — PATIENT INSTRUCTIONS
Please continue current cardiac medications including aspirin 81 mg, metoprolol succinate 25 mg daily, rosuvastatin 40 mg daily.      Please followup with me in Cardiology clinic within the next 5 to 6 months.  Please return to clinic sooner or seek emergent care if your symptoms reoccur or worsen.

## 2024-05-29 ENCOUNTER — TELEPHONE (OUTPATIENT)
Dept: CARDIOLOGY | Facility: CLINIC | Age: 69
End: 2024-05-29
Payer: MEDICARE

## 2024-05-29 NOTE — TELEPHONE ENCOUNTER
Susan from [facility] [phone] called asking for the Doxycycline dose for this patient for premedication prior to having 5 teeth extracted. Patient has a long list of allergies but has stated that she has taken Doxycycline without issue in the past. This nurse informed Susan that Dr Martinez recommends Doxycycline 100 mg by mouth 30-60 minutes prior to dental extractions.  
2024 00:00

## 2024-06-19 ENCOUNTER — APPOINTMENT (OUTPATIENT)
Dept: PRIMARY CARE | Facility: CLINIC | Age: 69
End: 2024-06-19
Payer: MEDICARE

## 2024-07-15 ENCOUNTER — APPOINTMENT (OUTPATIENT)
Dept: CARDIOLOGY | Facility: CLINIC | Age: 69
End: 2024-07-15
Payer: MEDICARE

## 2024-07-24 NOTE — PROGRESS NOTES
TRANSFER PATIENT - PROGRAMMING     HISTORY:  Elena Patrick was seen as a Madalyn transfer patient. Dodie from [facility] was on site to help lead the appointment.    Patient arrives with changes to her medical history and perceived decline in hearing after medical changes.    Program Parameters:      This patient was seen for her annual follow-up visit with her cochlear implant.  The surgical site appears well-healed and healthy.  The magnet strength was appropriate.    Impedances were within normal limits.  Programming adjustments were made based on the patient's subjective impression.  The patient reported minimal changes to her sound quality.    Aided speech perception testing was not completed due to the patient having aided testing competed at her 4/2024 hearing assessment.    Per Dodie at [facility], her processor is out of warranty and she is eligible for an upgrade. We should consider upgrade due to changes in perception and no troubleshooting equipment being available in clinic.     Recommendations:  Full time use of cochlear implant  Return with upgraded processor  Follow up with ENT surgeon annually; sooner if concerns arise    Completed by:  David Medrano, CCC-A, [facility]  Licensed Audiologist   Certified Occupational Hearing Conservationist

## 2024-08-01 ENCOUNTER — APPOINTMENT (OUTPATIENT)
Dept: AUDIOLOGY | Facility: CLINIC | Age: 69
End: 2024-08-01
Payer: MEDICARE

## 2024-08-09 ENCOUNTER — APPOINTMENT (OUTPATIENT)
Dept: PRIMARY CARE | Facility: CLINIC | Age: 69
End: 2024-08-09
Payer: MEDICARE

## 2024-08-09 VITALS
HEART RATE: 86 BPM | WEIGHT: 174 LBS | BODY MASS INDEX: 30.83 KG/M2 | HEIGHT: 63 IN | DIASTOLIC BLOOD PRESSURE: 61 MMHG | SYSTOLIC BLOOD PRESSURE: 105 MMHG

## 2024-08-09 DIAGNOSIS — Z78.0 POSTMENOPAUSAL: ICD-10-CM

## 2024-08-09 DIAGNOSIS — I05.0 MITRAL VALVE STENOSIS, SEVERE: Chronic | ICD-10-CM

## 2024-08-09 DIAGNOSIS — I10 ESSENTIAL HYPERTENSION: ICD-10-CM

## 2024-08-09 DIAGNOSIS — J44.9 CHRONIC OBSTRUCTIVE PULMONARY DISEASE, UNSPECIFIED COPD TYPE (MULTI): ICD-10-CM

## 2024-08-09 DIAGNOSIS — E78.5 DYSLIPIDEMIA: ICD-10-CM

## 2024-08-09 DIAGNOSIS — I27.20 PULMONARY HYPERTENSION (MULTI): ICD-10-CM

## 2024-08-09 DIAGNOSIS — Z76.89 ENCOUNTER TO ESTABLISH CARE: ICD-10-CM

## 2024-08-09 DIAGNOSIS — E03.9 HYPOTHYROIDISM, UNSPECIFIED TYPE: ICD-10-CM

## 2024-08-09 DIAGNOSIS — I34.0 MITRAL VALVE INSUFFICIENCY, UNSPECIFIED ETIOLOGY: ICD-10-CM

## 2024-08-09 DIAGNOSIS — Z87.891 HISTORY OF SMOKING 25-50 PACK YEARS: ICD-10-CM

## 2024-08-09 DIAGNOSIS — Z00.00 MEDICARE ANNUAL WELLNESS VISIT, SUBSEQUENT: Primary | ICD-10-CM

## 2024-08-09 DIAGNOSIS — Z87.891 FORMER CIGARETTE SMOKER: ICD-10-CM

## 2024-08-09 PROCEDURE — 1036F TOBACCO NON-USER: CPT | Performed by: NURSE PRACTITIONER

## 2024-08-09 PROCEDURE — 3078F DIAST BP <80 MM HG: CPT | Performed by: NURSE PRACTITIONER

## 2024-08-09 PROCEDURE — 99214 OFFICE O/P EST MOD 30 MIN: CPT | Performed by: NURSE PRACTITIONER

## 2024-08-09 PROCEDURE — 3008F BODY MASS INDEX DOCD: CPT | Performed by: NURSE PRACTITIONER

## 2024-08-09 PROCEDURE — G0439 PPPS, SUBSEQ VISIT: HCPCS | Performed by: NURSE PRACTITIONER

## 2024-08-09 PROCEDURE — 1170F FXNL STATUS ASSESSED: CPT | Performed by: NURSE PRACTITIONER

## 2024-08-09 PROCEDURE — 1124F ACP DISCUSS-NO DSCNMKR DOCD: CPT | Performed by: NURSE PRACTITIONER

## 2024-08-09 PROCEDURE — 1159F MED LIST DOCD IN RCRD: CPT | Performed by: NURSE PRACTITIONER

## 2024-08-09 PROCEDURE — 3074F SYST BP LT 130 MM HG: CPT | Performed by: NURSE PRACTITIONER

## 2024-08-09 RX ORDER — DOXYCYCLINE 100 MG/1
CAPSULE ORAL
COMMUNITY
Start: 2024-06-03

## 2024-08-09 RX ORDER — DOXYCYCLINE 50 MG/1
1 CAPSULE ORAL
COMMUNITY
Start: 2024-06-12

## 2024-08-09 ASSESSMENT — ACTIVITIES OF DAILY LIVING (ADL)
GROCERY_SHOPPING: INDEPENDENT
TAKING_MEDICATION: INDEPENDENT
DOING_HOUSEWORK: INDEPENDENT
DRESSING: INDEPENDENT
BATHING: INDEPENDENT
MANAGING_FINANCES: INDEPENDENT

## 2024-08-09 ASSESSMENT — ENCOUNTER SYMPTOMS
OCCASIONAL FEELINGS OF UNSTEADINESS: 0
LOSS OF SENSATION IN FEET: 0
DEPRESSION: 0

## 2024-08-09 NOTE — PROGRESS NOTES
"Subjective   Reason for Visit: Elena Patrick is an 68 y.o. female here for a Medicare Wellness visit.     Past Medical, Surgical, and Family History reviewed and updated in chart.    Reviewed all medications by prescribing practitioner or clinical pharmacist (such as prescriptions, OTCs, herbal therapies and supplements) and documented in the medical record.    HPI    1. CAD s/p CABG  2. HFpEF  3. Pulmonary hypertension  4. Mitral valve stenosis s/p MVR  5. Hypertension  Follows with cardiology, Dr. Ben Pedraza  Checks her blood pressure at home multiple times per day, typically runs 110s/50s  Current meds: metoprolol 25 mg daily, rosuvastatin 40 mg daily, and aspirin 81 mg daily  Finished cardiac rehab in November 2023, she completed a total of 20 weeks  She walks 1.5-2 miles per day  Diet is very healthy, does not consume caffeine and limits salt intake       5. COPD  Had smoked for about 50 years, 1/2 ppd day  Had chronic wheezing but this stopped after she quit smoking in winter 2023  Denies current shortness of breath, coughing, or wheezing     6. Hypothyroidism  Thought she would have lost weight with improved diet and regular physical activity  Reports hair thinning/loss  Denies changes in bowel habits, depression, anxiety      Patient Care Team:  PHUC See-CNP as PCP - General (Family Medicine)  Ben ACEVEDO MD as Cardiologist (Cardiology)  Cuate Trevino MD as Consulting Physician (Dermatology)  Lizabeth Crain MD as Surgeon (Otolaryngology)     Review of Systems    Objective   Vitals:  /61   Pulse 86   Ht 1.6 m (5' 3\")   Wt 78.9 kg (174 lb)   BMI 30.82 kg/m²       Physical Exam  General: Alert and oriented, in no acute distress. Appears stated age, well-nourished, and well hydrated  HEENT:  - Head: Normocephalic and atraumatic   - Eyes: EOMI, PERRLA  - ENT: Hearing grossly intact  Heart: RRR. No murmurs, clicks, or rubs  Lungs: Unlabored breathing. CTAB with no crackles, " wheezes, or rhonchi  Abdomen: Normal BS in all 4 quadrants. Soft, non-tender, non-distended, with no masses  Extremities: Warm and well perfused. No edema. Normal peripheral pulses  Musculoskeletal: Normal gait and station  Neurological: Alert and oriented. No gross neurological deficits  Psychological: Appropriate mood and affect  Skin: No rash, abnormal lesions, cyanosis, or erythema     Assessment/Plan   Problem List Items Addressed This Visit             ICD-10-CM    Chronic obstructive pulmonary disease (Multi) J44.9    Dyslipidemia E78.5    Relevant Orders    Lipid Panel    Comprehensive metabolic panel    Essential hypertension I10    Hypothyroidism E03.9    Mitral regurgitation I34.0    Mitral valve stenosis, severe (Chronic) I05.0    Pulmonary hypertension (Multi) I27.20     Other Visit Diagnoses         Codes    Medicare annual wellness visit, subsequent    -  Primary Z00.00    Postmenopausal     Z78.0    Relevant Orders    XR DEXA bone density    Former cigarette smoker     Z87.891    Relevant Orders    CT lung screening low dose    History of smoking 25-50 pack years     Z87.891    Relevant Orders    CT lung screening low dose    Encounter to establish care     Z76.89    Relevant Orders    Referral to Geriatrics          1) CAD s/p CABG  2) HFpEF  3) Pulmonary hypertension  4) Mitral valve stenosis s/p MVR  5) Hypertension  - Blood pressure well controlled  - Continue healthy diet and regular physical activity  - Following with cardiology, Dr. Martinez  - Continue ASA 81 mg every day, metoprolol 25 mg every day, rosuvastatin 40 mg every day     6) COPD  - Well controlled, asymptomatic  - Quit smoking winter 2023    7) Hypothyroidism  - Continue taking levothyroxine 25 mg every day  - TSH normal 4/2024    8) Medicare Wellness Exam  - CT lung CA screening ordered  - DEXA ordered  - Needs Tdap & shingrix --> deferred to pharmacy  - Labs: CMP & lipid panel  - Lifestyle management    RTC in 6 months for follow  up visit.    RANDELL Bailon Student  [facility]

## 2024-08-09 NOTE — PROGRESS NOTES
I was present with the APRN student who participated in the documentation of this note. I have personally seen and re-examined the patient and performed the medical decision-making components (assessment and plan of care). I have reviewed the APRN student documentation and verified the findings in the note as written with additions or exceptions as stated in the body of this note.    Jackie Warner, APRN-CNP

## 2024-08-12 ENCOUNTER — CLINICAL SUPPORT (OUTPATIENT)
Dept: PRIMARY CARE | Facility: CLINIC | Age: 69
End: 2024-08-12
Payer: MEDICARE

## 2024-08-12 DIAGNOSIS — E78.5 DYSLIPIDEMIA: ICD-10-CM

## 2024-08-12 PROCEDURE — 80053 COMPREHEN METABOLIC PANEL: CPT

## 2024-08-12 PROCEDURE — 80061 LIPID PANEL: CPT

## 2024-08-13 DIAGNOSIS — Z12.31 ENCOUNTER FOR SCREENING MAMMOGRAM FOR MALIGNANT NEOPLASM OF BREAST: Primary | ICD-10-CM

## 2024-08-13 LAB
ALBUMIN SERPL BCP-MCNC: 4.5 G/DL (ref 3.4–5)
ALP SERPL-CCNC: 51 U/L (ref 33–136)
ALT SERPL W P-5'-P-CCNC: 20 U/L (ref 7–45)
ANION GAP SERPL CALC-SCNC: 14 MMOL/L (ref 10–20)
AST SERPL W P-5'-P-CCNC: 22 U/L (ref 9–39)
BILIRUB SERPL-MCNC: 0.5 MG/DL (ref 0–1.2)
BUN SERPL-MCNC: 10 MG/DL (ref 6–23)
CALCIUM SERPL-MCNC: 9.6 MG/DL (ref 8.6–10.6)
CHLORIDE SERPL-SCNC: 102 MMOL/L (ref 98–107)
CHOLEST SERPL-MCNC: 115 MG/DL (ref 0–199)
CHOLESTEROL/HDL RATIO: 2
CO2 SERPL-SCNC: 27 MMOL/L (ref 21–32)
CREAT SERPL-MCNC: 0.79 MG/DL (ref 0.5–1.05)
EGFRCR SERPLBLD CKD-EPI 2021: 81 ML/MIN/1.73M*2
GLUCOSE SERPL-MCNC: 102 MG/DL (ref 74–99)
HDLC SERPL-MCNC: 58.1 MG/DL
LDLC SERPL CALC-MCNC: 30 MG/DL
NON HDL CHOLESTEROL: 57 MG/DL (ref 0–149)
POTASSIUM SERPL-SCNC: 4.3 MMOL/L (ref 3.5–5.3)
PROT SERPL-MCNC: 7.1 G/DL (ref 6.4–8.2)
SODIUM SERPL-SCNC: 139 MMOL/L (ref 136–145)
TRIGL SERPL-MCNC: 136 MG/DL (ref 0–149)
VLDL: 27 MG/DL (ref 0–40)

## 2024-08-27 ENCOUNTER — CLINICAL SUPPORT (OUTPATIENT)
Dept: AUDIOLOGY | Facility: CLINIC | Age: 69
End: 2024-08-27
Payer: MEDICARE

## 2024-08-27 DIAGNOSIS — H90.3 SENSORINEURAL HEARING LOSS (SNHL), BILATERAL: Primary | ICD-10-CM

## 2024-08-27 PROCEDURE — 92603 COCHLEAR IMPLT F/UP EXAM 7/>: CPT

## 2024-08-27 NOTE — PROGRESS NOTES
ADULT COCHLEAR IMPLANT FOLLOW UP PROGRAMMING    Clinical Indication: sensorineural hearing loss    HISTORY:  Elena Patrick, age 68 years, arrives today to upgrade her left Sonnet 2 processor. Joyce is a transfer patinet and has established care with myself and Dr. Krystyna Arnold from [employer] assisted virtually with the appointment.       Position Program Description   1 MAP 5  Psychmap SCAN   2 MAP 5  Psychmap non scan   3     4         This patient was seen for her  upgrade as transfer patient  The surgical site appears well-healed and healthy.  The magnet strength was appropriate( 3).    Impedances were within normal limits.  Programming adjustments were made based on a converted program.  The patient was s very satisfied with the sound quality of today's programming and denied any loudness discomfort.     Demonstrated water kit and wireless remote  Created online med el account, downloaded lloyd, paired to lloyd and demonstrated  Elena does not utilize rechargeable batteries but was made aware they are in her kit  Elena does not want to wireless stream but has the accessory to do so. If she would like to learn how to use this she should schedule a one-on-one with [employer]    Time spent with patient: 90 minutes   Recommendations:  Full time use of left cochlear implant  Return annually or programming and optimization of cochlear implant processor  Follow up with ENT surgeon annually; sooner if concerns arise  Contact cochlear  if there are concerns with processor or accessories  Annual hearing assessment as warranted     Completed by:  David Medrano, CCC-A, [facility]  Licensed Audiologist   Certified Occupational Hearing Conservationist

## 2024-10-04 ENCOUNTER — HOSPITAL ENCOUNTER (OUTPATIENT)
Dept: RADIOLOGY | Facility: CLINIC | Age: 69
Discharge: HOME | End: 2024-10-04
Payer: MEDICARE

## 2024-10-04 DIAGNOSIS — Z87.891 FORMER CIGARETTE SMOKER: ICD-10-CM

## 2024-10-04 DIAGNOSIS — Z78.0 POSTMENOPAUSAL: ICD-10-CM

## 2024-10-04 DIAGNOSIS — Z87.891 HISTORY OF SMOKING 25-50 PACK YEARS: ICD-10-CM

## 2024-10-04 PROCEDURE — 71271 CT THORAX LUNG CANCER SCR C-: CPT

## 2024-10-04 PROCEDURE — 77080 DXA BONE DENSITY AXIAL: CPT

## 2024-10-15 ENCOUNTER — OFFICE VISIT (OUTPATIENT)
Dept: CARDIOLOGY | Facility: CLINIC | Age: 69
End: 2024-10-15
Payer: MEDICARE

## 2024-10-15 VITALS
OXYGEN SATURATION: 98 % | BODY MASS INDEX: 31.18 KG/M2 | HEART RATE: 72 BPM | DIASTOLIC BLOOD PRESSURE: 56 MMHG | SYSTOLIC BLOOD PRESSURE: 122 MMHG | WEIGHT: 176 LBS | HEIGHT: 63 IN

## 2024-10-15 DIAGNOSIS — I34.0 MITRAL VALVE INSUFFICIENCY, UNSPECIFIED ETIOLOGY: ICD-10-CM

## 2024-10-15 DIAGNOSIS — I25.10 CALCIFICATION OF CORONARY ARTERY: ICD-10-CM

## 2024-10-15 DIAGNOSIS — M25.559 ARTHRALGIA OF HIP, UNSPECIFIED LATERALITY: ICD-10-CM

## 2024-10-15 DIAGNOSIS — R06.09 DOE (DYSPNEA ON EXERTION): ICD-10-CM

## 2024-10-15 DIAGNOSIS — E78.5 DYSLIPIDEMIA: ICD-10-CM

## 2024-10-15 DIAGNOSIS — I47.10 PAROXYSMAL SUPRAVENTRICULAR TACHYCARDIA (CMS-HCC): Primary | ICD-10-CM

## 2024-10-15 DIAGNOSIS — R00.2 PALPITATIONS: ICD-10-CM

## 2024-10-15 DIAGNOSIS — I34.2 NONRHEUMATIC MITRAL VALVE STENOSIS: ICD-10-CM

## 2024-10-15 DIAGNOSIS — I25.10 CORONARY ARTERY DISEASE INVOLVING NATIVE CORONARY ARTERY OF NATIVE HEART WITHOUT ANGINA PECTORIS: ICD-10-CM

## 2024-10-15 DIAGNOSIS — I50.33 ACUTE ON CHRONIC DIASTOLIC CONGESTIVE HEART FAILURE: ICD-10-CM

## 2024-10-15 DIAGNOSIS — Z95.1 STATUS POST SINGLE VESSEL CORONARY ARTERY BYPASS: ICD-10-CM

## 2024-10-15 DIAGNOSIS — Z95.2 H/O PROSTHETIC MITRAL VALVE: ICD-10-CM

## 2024-10-15 DIAGNOSIS — J44.9 CHRONIC OBSTRUCTIVE PULMONARY DISEASE, UNSPECIFIED COPD TYPE (MULTI): ICD-10-CM

## 2024-10-15 PROCEDURE — 1036F TOBACCO NON-USER: CPT | Performed by: STUDENT IN AN ORGANIZED HEALTH CARE EDUCATION/TRAINING PROGRAM

## 2024-10-15 PROCEDURE — 1159F MED LIST DOCD IN RCRD: CPT | Performed by: STUDENT IN AN ORGANIZED HEALTH CARE EDUCATION/TRAINING PROGRAM

## 2024-10-15 PROCEDURE — 3074F SYST BP LT 130 MM HG: CPT | Performed by: STUDENT IN AN ORGANIZED HEALTH CARE EDUCATION/TRAINING PROGRAM

## 2024-10-15 PROCEDURE — 3078F DIAST BP <80 MM HG: CPT | Performed by: STUDENT IN AN ORGANIZED HEALTH CARE EDUCATION/TRAINING PROGRAM

## 2024-10-15 PROCEDURE — 99214 OFFICE O/P EST MOD 30 MIN: CPT | Performed by: STUDENT IN AN ORGANIZED HEALTH CARE EDUCATION/TRAINING PROGRAM

## 2024-10-15 PROCEDURE — 3008F BODY MASS INDEX DOCD: CPT | Performed by: STUDENT IN AN ORGANIZED HEALTH CARE EDUCATION/TRAINING PROGRAM

## 2024-10-15 NOTE — PATIENT INSTRUCTIONS
Please continue current cardiac medications including aspirin 81 mg, metoprolol succinate 25 mg daily, rosuvastatin 40 mg daily.      We will obtain a transthoracic echocardiogram for structural evaluation including ejection fraction, assessment of regional wall motion abnormalities or valvular disease, and further evaluation of hemodynamics prior to your follow-up visit.    Please followup with me in Cardiology clinic within the next 7 months.  Please return to clinic sooner or seek emergent care if your symptoms reoccur or worsen.

## 2024-10-15 NOTE — PROGRESS NOTES
HPI:    Elena Patrick is a 69 y.o. female with pertinent history of hypothyroidism, cochlear implant, tobacco abuse, likely underlying coronary artery with coronary calcium score of 507 on CT calcium score performed 6/16/2020, preserved ejection fraction with severe mitral stenosis with peak gradient of 15 mmHg, severe left atrial enlargement with mild to moderate mitral regurgitation, mild to moderate pulmonary hypertension with RVSP of 43 mmHg on echo performed 12/6/2022, moderate 70% mid LAD lesion, pulmonary artery pressure of 35 to 24 mmHg, pulmonary wedge pressure of 24 mmHg, LVEDP of 14 mmHg, severe mitral stenosis and cardiac catheterization performed 4/11/2023, status post Mitral valve replacement #29 Epic, CABG LIMA to LAD, PFO closure, LA atrial appendage atrial clip, preserved ejection fraction with restrictive diastolic dysfunction, moderate left atrial enlargement, bioprosthetic mitral valve with 5 mm gradient, mildly elevated RVSP on echo performed 5/5/2023, preserved LVEF, impaired relaxation, mildly reduced right ventricular function, moderate to severe left atrial enlargement, left atrial pended clip, moderate mitral annular calcification, prosthetic #29 epic mitral valve with gradients of 16.4/7.8 mmHg with an overall reduction RVSP on echo performed 11/29/2023, average heart rate 74 bpm, single episode of nonsustained ventricular tachycardia lasting 3 beats, 33 episodes of paroxysmal supraventricular tachycardia up to 15 beats on event monitor performed March 2024 presents to cardiology clinic for follow-up.     She is doing relatively well.  He does have some postoperative scarring including keloid like changes that are uncomfortable.  She has tried a variety of creams.  We did discuss other options like lidocaine patches but she would like to hold off on this for now.  Dyspnea on exertion is stable.  We reviewed and discussed her prior echo.  No significant chest discomfort.  No  exacerbating or relieving factors.  Patient denies chest pain and angina.  Pt denies orthopnea, and paroxysmal nocturnal dyspnea.  Pt denies worsening lower extremity edema.  Pt denies syncope.  No recent falls.  No fever or chills.  No cough.  No change in bowel or bladder habits.  No sick contacts.  No recent travel.    12 point review of systems including (Constitutional, Eyes, ENMT, Respiratory, Cardiac, Gastrointestinal, Neurological, Psychiatric, and Hematologic) was performed and is otherwise negative.    Past medical history reviewed:   has a past medical history of Acute on chronic respiratory failure with hypoxia (Multi) (02/16/2023), CHF (congestive heart failure), Disease of thyroid gland, Heart disease, HL (hearing loss), Nicotine dependence (04/17/2024), Personal history of (healed) traumatic fracture (09/23/2021), Personal history of other diseases of the musculoskeletal system and connective tissue (11/01/2019), Personal history of other endocrine, nutritional and metabolic disease (11/01/2019), and Personal history of other infectious and parasitic diseases (11/01/2019).    Past surgical history reviewed:   has a past surgical history that includes Other surgical history (11/01/2019); Coronary artery bypass graft (5/1/2023); Cardiac catheterization (4/2023); Cardiac surgery (5/1/2023); and Cardiac valve replacement (5/1/2023).    Social history reviewed:   reports that she quit smoking about 20 months ago. Her smoking use included cigarettes. She started smoking about 53 years ago. She has a 25.8 pack-year smoking history. She has never used smokeless tobacco. She reports that she does not drink alcohol and does not use drugs.     Family history:  No sudden cardiac death or premature coronary artery disease.     Allergies reviewed: Azithromycin, Codeine, Penicillins, Apixaban, Atorvastatin, Erythromycin, Prednisone, and Clindamycin     Medications reviewed:   Current Outpatient Medications    Medication Instructions    aspirin (ASPIRIN CHILDRENS) 81 mg, oral, Daily    calcipotriene (Dovonex) 0.005 % ointment Apply to affected areas twice daily for psoriasis when active as needed.    doxycycline (Monodox) 50 mg capsule 1 capsule, oral, Every 12 hours scheduled (0630,1830)    doxycycline (Vibramycin) 100 mg capsule 30-60 MIN BEFORE APPT    levothyroxine (SYNTHROID, LEVOXYL) 25 mcg, oral, Daily    metoprolol succinate XL (TOPROL-XL) 25 mg, oral, Daily    MULTIVITAMIN ORAL 1 tablet, oral, Daily RT    rosuvastatin (CRESTOR) 40 mg, oral, Daily    triamcinolone (Kenalog) 0.1 % cream Apply to affected area 1-2 times daily as needed.        Vitals reviewed: Visit Vitals  /56   Pulse 72       Physical Exam:   General:  Patient is awake, alert, and oriented.  Patient is in no acute distress.  HEENT:  Pupils equal and reactive.  Normocephalic.  Moist mucosa.    Neck:  No thyromegaly.  Normal Jugular Venous Pressure.  Cardiovascular:  Regular rate and rhythm.  Normal S1 and S2.  1/6 TRICIA.  Midline scar.  Pulmonary:  Clear to auscultation bilaterally.  Abdomen:  Soft. Non-tender.   Non-distended.  Positive bowel sounds.  Lower Extremities:  2+ pedal pulses. No LE edema.  Neurologic:  Cranial nerves intact.  No focal deficit.   Skin: Skin warm and dry, normal skin turgor.   Psychiatric: Normal affect.    Last Labs:  CBC -      Lab Results   Component Value Date    WBC 10.1 05/10/2023    HGB 8.8 (L) 05/10/2023    HCT 27.6 (L) 05/10/2023     05/10/2023        CMP-  Lab Results   Component Value Date    GLUCOSE 102 (H) 08/12/2024     08/12/2024    K 4.3 08/12/2024     08/12/2024    CO2 27 08/12/2024    ANIONGAP 14 08/12/2024    BUN 10 08/12/2024    CREATININE 0.79 08/12/2024    EGFR 81 08/12/2024    CALCIUM 9.6 08/12/2024    PHOS 2.7 05/06/2023    PROT 7.1 08/12/2024    ALBUMIN 4.5 08/12/2024    AST 22 08/12/2024    ALT 20 08/12/2024    ALKPHOS 51 08/12/2024    BILITOT 0.5 08/12/2024         LIPIDS-  Lab Results   Component Value Date    CHOL 115 08/12/2024    TRIG 136 08/12/2024    HDL 58.1 08/12/2024    CHHDL 2.0 08/12/2024    VLDL 27 08/12/2024        OTHERS-  Lab Results   Component Value Date    HGBA1C 5.5 04/17/2024     (H) 02/27/2023        I personally reviewed the patient's recent vitals, labs, medications, orders, EKGs, pertinent cardiac imaging/ echocardiography and ischemic evaluations including stress testing/ cardiac catheterization.    Assessment and Plan:    Elena Patrick is a 69 y.o. female with pertinent history of hypothyroidism, cochlear implant, tobacco abuse, likely underlying coronary artery with coronary calcium score of 507 on CT calcium score performed 6/16/2020, preserved ejection fraction with severe mitral stenosis with peak gradient of 15 mmHg, severe left atrial enlargement with mild to moderate mitral regurgitation, mild to moderate pulmonary hypertension with RVSP of 43 mmHg on echo performed 12/6/2022, moderate 70% mid LAD lesion, pulmonary artery pressure of 35 to 24 mmHg, pulmonary wedge pressure of 24 mmHg, LVEDP of 14 mmHg, severe mitral stenosis and cardiac catheterization performed 4/11/2023, status post Mitral valve replacement #29 Epic, CABG LIMA to LAD, PFO closure, LA atrial appendage atrial clip, preserved ejection fraction with restrictive diastolic dysfunction, moderate left atrial enlargement, bioprosthetic mitral valve with 5 mm gradient, mildly elevated RVSP on echo performed 5/5/2023, preserved LVEF, impaired relaxation, mildly reduced right ventricular function, moderate to severe left atrial enlargement, left atrial pended clip, moderate mitral annular calcification, prosthetic #29 epic mitral valve with gradients of 16.4/7.8 mmHg with an overall reduction RVSP on echo performed 11/29/2023, average heart rate 74 bpm, single episode of nonsustained ventricular tachycardia lasting 3 beats, 33 episodes of paroxysmal supraventricular  tachycardia up to 15 beats on event monitor performed March 2024 presents to cardiology clinic for follow-up.  She is doing relatively well.  He does have some postoperative scarring including keloid like changes that are uncomfortable.  She has tried a variety of creams.  We did discuss other options like lidocaine patches but she would like to hold off on this for now.  Dyspnea on exertion is stable.  We reviewed and discussed her prior echo.  No significant chest discomfort.     Please continue current cardiac medications including aspirin 81 mg, metoprolol succinate 25 mg daily, rosuvastatin 40 mg daily.      We will obtain a transthoracic echocardiogram for structural evaluation including ejection fraction, assessment of regional wall motion abnormalities or valvular disease, and further evaluation of hemodynamics prior to your follow-up visit.    Please followup with me in Cardiology clinic within the next 7 months.  Please return to clinic sooner or seek emergent care if your symptoms reoccur or worsen.    Thank you for allowing me to participate in their care.  Please feel free to call me with any further questions or concerns.        Ben Martinez MD, FACC, STEFAN[facility] WILEY  Division of Cardiovascular Medicine  System Director, Nuclear Cardiology   Medical Director, [facility] [place], [facility]   Clinical , [facility]  [email or web address]   Office:  [phone]

## 2024-10-29 ENCOUNTER — APPOINTMENT (OUTPATIENT)
Dept: DERMATOLOGY | Facility: CLINIC | Age: 69
End: 2024-10-29
Payer: MEDICARE

## 2024-11-11 ENCOUNTER — OFFICE VISIT (OUTPATIENT)
Dept: URGENT CARE | Age: 69
End: 2024-11-11
Payer: MEDICARE

## 2024-11-11 VITALS
TEMPERATURE: 98.9 F | WEIGHT: 170 LBS | RESPIRATION RATE: 16 BRPM | HEIGHT: 61 IN | BODY MASS INDEX: 32.1 KG/M2 | SYSTOLIC BLOOD PRESSURE: 123 MMHG | HEART RATE: 97 BPM | OXYGEN SATURATION: 94 % | DIASTOLIC BLOOD PRESSURE: 67 MMHG

## 2024-11-11 DIAGNOSIS — R05.9 COUGH, UNSPECIFIED TYPE: ICD-10-CM

## 2024-11-11 LAB
POC RAPID INFLUENZA A: NEGATIVE
POC RAPID INFLUENZA B: NEGATIVE
POC SARS-COV-2 AG BINAX: ABNORMAL

## 2024-11-11 PROCEDURE — 99203 OFFICE O/P NEW LOW 30 MIN: CPT

## 2024-11-11 PROCEDURE — 3008F BODY MASS INDEX DOCD: CPT

## 2024-11-11 PROCEDURE — 87811 SARS-COV-2 COVID19 W/OPTIC: CPT

## 2024-11-11 PROCEDURE — 1159F MED LIST DOCD IN RCRD: CPT

## 2024-11-11 PROCEDURE — 3078F DIAST BP <80 MM HG: CPT

## 2024-11-11 PROCEDURE — 87804 INFLUENZA ASSAY W/OPTIC: CPT

## 2024-11-11 PROCEDURE — 3074F SYST BP LT 130 MM HG: CPT

## 2024-11-11 ASSESSMENT — ENCOUNTER SYMPTOMS: COUGH: 1

## 2024-11-11 NOTE — PROGRESS NOTES
Subjective   Patient ID: Elena Patrick is a 69 y.o. female. They present today with a chief complaint of Request For Covid Testing (Positive home covid test), Cough, and Nasal Congestion.    History of Present Illness  Patient is 69-year-old female with history of CHF and hypertension who presents to urgent care today with a complaint of flulike symptoms.  She states she took an at-home COVID test last evening which was negative but she took another 1 this morning and it was positive.  She denies any fevers, chills, chest pain or shortness of breath.  No other complaints or concerns mention at this time.      History provided by:  Patient  Cough        Past Medical History  Allergies as of 11/11/2024 - Reviewed 11/11/2024   Allergen Reaction Noted    Azithromycin Shortness of breath 12/21/2005    Codeine Shortness of breath, Unknown, and Hives 12/21/2005    Penicillins Anaphylaxis, Rash, and Swelling 12/21/2005    Apixaban Diarrhea, GI Upset, and Bleeding 07/03/2023    Atorvastatin Unknown 12/13/2023    Erythromycin Swelling 12/21/2005    Prednisone Unknown 12/21/2005    Clindamycin Rash 12/13/2023       (Not in a hospital admission)         Past Medical History:   Diagnosis Date    Acute on chronic respiratory failure with hypoxia (Multi) 02/16/2023    CHF (congestive heart failure)     Disease of thyroid gland     Heart disease     HL (hearing loss)     Nicotine dependence 04/17/2024    Personal history of (healed) traumatic fracture 09/23/2021    History of fracture of left hip    Personal history of other diseases of the musculoskeletal system and connective tissue 11/01/2019    History of arthritis    Personal history of other endocrine, nutritional and metabolic disease 11/01/2019    History of thyroid disorder    Personal history of other infectious and parasitic diseases 11/01/2019    History of herpes simplex infection       Past Surgical History:   Procedure Laterality Date    CARDIAC CATHETERIZATION   "4/2023    CARDIAC SURGERY  5/1/2023    CARDIAC VALVE REPLACEMENT  5/1/2023    CORONARY ARTERY BYPASS GRAFT  5/1/2023    OTHER SURGICAL HISTORY  11/01/2019    Cochlear implant surgery        reports that she quit smoking about 21 months ago. Her smoking use included cigarettes. She started smoking about 53 years ago. She has a 25.8 pack-year smoking history. She has never used smokeless tobacco. She reports that she does not drink alcohol and does not use drugs.    Review of Systems  Review of Systems   HENT:  Positive for congestion.    Respiratory:  Positive for cough.                                   Objective    Vitals:    11/11/24 1114   BP: 123/67   Pulse: 97   Resp: 16   Temp: 37.2 °C (98.9 °F)   TempSrc: Oral   SpO2: 94%   Weight: 77.1 kg (170 lb)   Height: 1.549 m (5' 1\")     No LMP recorded.    Physical Exam  Vitals and nursing note reviewed.   Constitutional:       General: She is not in acute distress.     Appearance: Normal appearance. She is not ill-appearing, toxic-appearing or diaphoretic.   HENT:      Head: Normocephalic and atraumatic.      Mouth/Throat:      Mouth: Mucous membranes are moist.   Eyes:      Extraocular Movements: Extraocular movements intact.      Conjunctiva/sclera: Conjunctivae normal.      Pupils: Pupils are equal, round, and reactive to light.   Cardiovascular:      Rate and Rhythm: Normal rate and regular rhythm.      Pulses: Normal pulses.      Heart sounds: Normal heart sounds.   Pulmonary:      Effort: Pulmonary effort is normal. No respiratory distress.      Breath sounds: Normal breath sounds. No stridor. No wheezing, rhonchi or rales.   Chest:      Chest wall: No tenderness.   Musculoskeletal:         General: Normal range of motion.      Cervical back: Normal range of motion and neck supple.   Skin:     General: Skin is warm and dry.      Capillary Refill: Capillary refill takes less than 2 seconds.   Neurological:      General: No focal deficit present.      Mental " Status: She is alert and oriented to person, place, and time.   Psychiatric:         Mood and Affect: Mood normal.         Behavior: Behavior normal.         Procedures      Assessment/Plan   Allergies, medications, history, and pertinent labs/EKGs/Imaging reviewed by GRIFFNI Sanabria.     Medical Decision Making  Patient is well appearing, afebrile, non toxic, not hypoxic, and appropriate for outpatient treatment and management at time of evaluation. Patient presents with cold symptoms as described above. Differential includes but not limited to: COVID, influenza, URI, other.  Rapid flu is negative.  Rapid COVID is positive.  Recommended over-the-counter medication as needed for symptom relief and close follow-up PCP.  Patient is agreement this plan.  Patient was discharged in stable condition.  All questions and concerns addressed.      Plan: Discussed differential with the patient. Patient voices understanding and is agreeable to close follow-up with their PCP in the next 2-3 days. They understand they should go to the emergency room immediately for any new, worsening or concerning symptoms. Patient understands return precautions and discharge instructions.      Orders and Diagnoses  Diagnoses and all orders for this visit:  Cough, unspecified type  -     POCT Influenza A/B manually resulted  -     POCT Covid-19 Rapid Antigen      Medical Admin Record      Follow Up Instructions  No follow-ups on file.    Patient disposition: Home    Electronically signed by GRIFFIN Sanabria  11:59 AM

## 2024-11-11 NOTE — PATIENT INSTRUCTIONS
You were seen at Urgent Care today and diagnosed with COVID.  Please treat as discussed. Monitor for red flags which we spoke about, If your symptoms change, worsen or become concerning in any way, please go to the emergency room immediately, otherwise you can followup with your PCP in 2-3 days as needed

## 2024-12-09 ENCOUNTER — APPOINTMENT (OUTPATIENT)
Dept: PRIMARY CARE | Facility: CLINIC | Age: 69
End: 2024-12-09
Payer: MEDICARE

## 2025-04-15 ENCOUNTER — APPOINTMENT (OUTPATIENT)
Dept: CARDIOLOGY | Facility: CLINIC | Age: 70
End: 2025-04-15
Payer: MEDICARE

## 2025-04-15 VITALS
BODY MASS INDEX: 31.71 KG/M2 | WEIGHT: 179 LBS | HEART RATE: 52 BPM | SYSTOLIC BLOOD PRESSURE: 130 MMHG | OXYGEN SATURATION: 99 % | HEIGHT: 63 IN | DIASTOLIC BLOOD PRESSURE: 70 MMHG

## 2025-04-15 DIAGNOSIS — R06.09 DOE (DYSPNEA ON EXERTION): ICD-10-CM

## 2025-04-15 DIAGNOSIS — I10 ESSENTIAL HYPERTENSION: ICD-10-CM

## 2025-04-15 DIAGNOSIS — Z95.1 STATUS POST SINGLE VESSEL CORONARY ARTERY BYPASS: ICD-10-CM

## 2025-04-15 DIAGNOSIS — I50.33 ACUTE ON CHRONIC DIASTOLIC CONGESTIVE HEART FAILURE: ICD-10-CM

## 2025-04-15 DIAGNOSIS — I47.10 PAROXYSMAL SUPRAVENTRICULAR TACHYCARDIA (CMS-HCC): Primary | ICD-10-CM

## 2025-04-15 DIAGNOSIS — E78.5 DYSLIPIDEMIA: ICD-10-CM

## 2025-04-15 DIAGNOSIS — R00.2 PALPITATIONS: ICD-10-CM

## 2025-04-15 DIAGNOSIS — I34.2 NONRHEUMATIC MITRAL VALVE STENOSIS: ICD-10-CM

## 2025-04-15 DIAGNOSIS — I34.0 MITRAL VALVE INSUFFICIENCY, UNSPECIFIED ETIOLOGY: ICD-10-CM

## 2025-04-15 DIAGNOSIS — I25.10 CORONARY ARTERY DISEASE INVOLVING NATIVE CORONARY ARTERY OF NATIVE HEART WITHOUT ANGINA PECTORIS: ICD-10-CM

## 2025-04-15 DIAGNOSIS — I25.10 CALCIFICATION OF CORONARY ARTERY: ICD-10-CM

## 2025-04-15 DIAGNOSIS — Z95.2 H/O PROSTHETIC MITRAL VALVE: ICD-10-CM

## 2025-04-15 PROCEDURE — 3008F BODY MASS INDEX DOCD: CPT | Performed by: STUDENT IN AN ORGANIZED HEALTH CARE EDUCATION/TRAINING PROGRAM

## 2025-04-15 PROCEDURE — G2211 COMPLEX E/M VISIT ADD ON: HCPCS | Performed by: STUDENT IN AN ORGANIZED HEALTH CARE EDUCATION/TRAINING PROGRAM

## 2025-04-15 PROCEDURE — 1036F TOBACCO NON-USER: CPT | Performed by: STUDENT IN AN ORGANIZED HEALTH CARE EDUCATION/TRAINING PROGRAM

## 2025-04-15 PROCEDURE — 1159F MED LIST DOCD IN RCRD: CPT | Performed by: STUDENT IN AN ORGANIZED HEALTH CARE EDUCATION/TRAINING PROGRAM

## 2025-04-15 PROCEDURE — 3078F DIAST BP <80 MM HG: CPT | Performed by: STUDENT IN AN ORGANIZED HEALTH CARE EDUCATION/TRAINING PROGRAM

## 2025-04-15 PROCEDURE — 99215 OFFICE O/P EST HI 40 MIN: CPT | Performed by: STUDENT IN AN ORGANIZED HEALTH CARE EDUCATION/TRAINING PROGRAM

## 2025-04-15 PROCEDURE — 3075F SYST BP GE 130 - 139MM HG: CPT | Performed by: STUDENT IN AN ORGANIZED HEALTH CARE EDUCATION/TRAINING PROGRAM

## 2025-04-15 RX ORDER — METOPROLOL SUCCINATE 25 MG/1
25 TABLET, EXTENDED RELEASE ORAL DAILY
Qty: 90 TABLET | Refills: 3 | Status: SHIPPED | OUTPATIENT
Start: 2025-04-15

## 2025-04-15 RX ORDER — ROSUVASTATIN CALCIUM 40 MG/1
40 TABLET, COATED ORAL DAILY
Qty: 90 TABLET | Refills: 3 | Status: SHIPPED | OUTPATIENT
Start: 2025-04-15

## 2025-04-15 ASSESSMENT — ENCOUNTER SYMPTOMS
LOSS OF SENSATION IN FEET: 0
OCCASIONAL FEELINGS OF UNSTEADINESS: 0

## 2025-04-15 NOTE — PATIENT INSTRUCTIONS
Please continue current cardiac medications including aspirin 81 mg, metoprolol succinate 25 mg daily, rosuvastatin 40 mg daily.      We will obtain a transthoracic echocardiogram for structural evaluation including ejection fraction, assessment of regional wall motion abnormalities or valvular disease, and further evaluation of hemodynamics prior to your follow-up visit.    Please followup with me in Cardiology clinic within the next 10-12 months.  Please return to clinic sooner or seek emergent care if your symptoms reoccur or worsen.

## 2025-04-15 NOTE — PROGRESS NOTES
"Patient was received in bed sleeping when writer resumed her shift. Up for breakfast and medications. Compliant and good appetite. Pt however declined to shower stating he will do that later. Denies pain. Denies SI/HI/SIB and hallucinations.. States\"my psychosis doesn't present that way\". Took a nap after breakfast and lunch. Nicotine gum given PRN intermittently. PRN zypreza 10 mg given for anxiety rated 5/10. Patient is isolative in his room and keeps to self when out in the mileu. Nursing will continue to encourage and monitor.    PM: Client was isolative in his room coming out intermittently to get nicotine gum. Not social, keeps to self when out in the mileu.Juan pain. Denies SI/HI/SIB and contracts for safety. Denies hallucinations. Good appetite. Medication compliant. Nursing will continue to monitor.  " Follow-up visit    HPI:    Elena Patrick is a 69 y.o. female with pertinent history of hypothyroidism, cochlear implant, tobacco abuse, likely underlying coronary artery with coronary calcium score of 507 on CT calcium score performed 6/16/2020, preserved ejection fraction with severe mitral stenosis with peak gradient of 15 mmHg, severe left atrial enlargement with mild to moderate mitral regurgitation, mild to moderate pulmonary hypertension with RVSP of 43 mmHg on echo performed 12/6/2022, moderate 70% mid LAD lesion, pulmonary artery pressure of 35 to 24 mmHg, pulmonary wedge pressure of 24 mmHg, LVEDP of 14 mmHg, severe mitral stenosis and cardiac catheterization performed 4/11/2023, status post Mitral valve replacement #29 Epic, CABG LIMA to LAD, PFO closure, LA atrial appendage atrial clip, prosthetic #29 epic mitral valve with gradients of 16.4/7.8 mmHg on echo performed 11/29/2023, average heart rate 74 bpm, single episode of nonsustained ventricular tachycardia lasting 3 beats, 33 episodes of paroxysmal supraventricular tachycardia up to 15 beats on event monitor performed March 2024, low normal LVEF of 53% with grade 1 diastolic dysfunction, mildly reduced right ventricular function, moderately dilated left atrial enlargement, #29 epic bioprosthetic mitral valve with a gradient of 20 over 7 mmHg, moderate mitral annular calcification, mild to moderate tricuspid regurgitation on echocardiogram performed 4/8/2025 presents to cardiology clinic for follow-up.     She is doing relatively well.  She recently downsized to an apartment and [place]  She is enjoying the change of pace.  She does note a fall over the winter with some musculoskeletal chest discomfort that has resolved.  We reviewed and discussed her recent echocardiogram.  Dyspnea on exertion is stable.  No significant chest discomfort.  No exacerbating or relieving factors.  Patient denies chest pain and angina.  Pt denies  orthopnea, and paroxysmal nocturnal dyspnea.  Pt denies worsening lower extremity edema.  Pt denies syncope.  No recent falls.  No fever or chills.  No cough.  No change in bowel or bladder habits.  No sick contacts.  No recent travel.    12 point review of systems including (Constitutional, Eyes, ENMT, Respiratory, Cardiac, Gastrointestinal, Neurological, Psychiatric, and Hematologic) was performed and is otherwise negative.    Past medical history reviewed:   has a past medical history of Abnormal ECG (3/18/24), Acute on chronic respiratory failure with hypoxia (02/16/2023), CHF (congestive heart failure), Disease of thyroid gland, Heart disease, HL (hearing loss), Nicotine dependence (04/17/2024), Personal history of (healed) traumatic fracture (09/23/2021), Personal history of other diseases of the musculoskeletal system and connective tissue (11/01/2019), Personal history of other endocrine, nutritional and metabolic disease (11/01/2019), and Personal history of other infectious and parasitic diseases (11/01/2019).    She has no past medical history of Personal history of irradiation.    Past surgical history reviewed:   has a past surgical history that includes Other surgical history (11/01/2019); Coronary artery bypass graft (5/1/2023); Cardiac catheterization (4/2023); Cardiac surgery (5/1/2023); Cardiac valve replacement (5/1/2023); and Breast biopsy (Right, 01/01/2018).    Social history reviewed:   reports that she quit smoking about 2 years ago. Her smoking use included cigarettes. She started smoking about 53 years ago. She has a 25.8 pack-year smoking history. She has never used smokeless tobacco. She reports that she does not drink alcohol and does not use drugs.     Family history:  No sudden cardiac death or premature coronary artery disease.     Allergies reviewed: Azithromycin, Codeine, Penicillins, Apixaban, Atorvastatin, Erythromycin, Prednisone, and Clindamycin     Medications reviewed:    Current Outpatient Medications   Medication Instructions    aspirin (ASPIRIN CHILDRENS) 81 mg, Daily    calcipotriene (Dovonex) 0.005 % ointment Apply to affected areas twice daily for psoriasis when active as needed.    doxycycline (Vibramycin) 100 mg capsule 30-60 MIN BEFORE APPT    levothyroxine (SYNTHROID, LEVOXYL) 25 mcg, oral, Daily    metoprolol succinate XL (TOPROL-XL) 25 mg, oral, Daily    MULTIVITAMIN ORAL 1 tablet, Daily RT    rosuvastatin (CRESTOR) 40 mg, oral, Daily    triamcinolone (Kenalog) 0.1 % cream Apply to affected area 1-2 times daily as needed.        Vitals reviewed: Visit Vitals  /70   Pulse 52       Physical Exam:   General:  Patient is awake, alert, and oriented.  Patient is in no acute distress.  HEENT:  Pupils equal and reactive.  Normocephalic.  Moist mucosa.    Neck:  No thyromegaly.  Normal Jugular Venous Pressure.  Cardiovascular:  Regular rate and rhythm.  Normal S1 and S2.  1/6 TRICIA.  Midline scar.  Pulmonary:  Clear to auscultation bilaterally.  Abdomen:  Soft. Non-tender.   Non-distended.  Positive bowel sounds.  Lower Extremities:  2+ pedal pulses. No LE edema.  Neurologic:  Cranial nerves intact.  No focal deficit.   Skin: Skin warm and dry, normal skin turgor.   Psychiatric: Normal affect.    Last Labs:  CBC -      Lab Results   Component Value Date    WBC 10.1 05/10/2023    HGB 8.8 (L) 05/10/2023    HCT 27.6 (L) 05/10/2023     05/10/2023        CMP-  Lab Results   Component Value Date    GLUCOSE 102 (H) 08/12/2024     08/12/2024    K 4.3 08/12/2024     08/12/2024    CO2 27 08/12/2024    ANIONGAP 14 08/12/2024    BUN 10 08/12/2024    CREATININE 0.79 08/12/2024    EGFR 81 08/12/2024    CALCIUM 9.6 08/12/2024    PHOS 2.7 05/06/2023    PROT 7.1 08/12/2024    ALBUMIN 4.5 08/12/2024    AST 22 08/12/2024    ALT 20 08/12/2024    ALKPHOS 51 08/12/2024    BILITOT 0.5 08/12/2024        LIPIDS-  Lab Results   Component Value Date    CHOL 115 08/12/2024    TRIG  136 08/12/2024    HDL 58.1 08/12/2024    CHHDL 2.0 08/12/2024    VLDL 27 08/12/2024        OTHERS-  Lab Results   Component Value Date    HGBA1C 5.5 04/17/2024     (H) 02/27/2023        I personally reviewed the patient's recent vitals, labs, medications, orders, EKGs, pertinent cardiac imaging/ echocardiography and ischemic evaluations including stress testing/ cardiac catheterization.    Assessment and Plan:    Elena Patrick is a 69 y.o. female with pertinent history of hypothyroidism, cochlear implant, tobacco abuse, likely underlying coronary artery with coronary calcium score of 507 on CT calcium score performed 6/16/2020, preserved ejection fraction with severe mitral stenosis with peak gradient of 15 mmHg, severe left atrial enlargement with mild to moderate mitral regurgitation, mild to moderate pulmonary hypertension with RVSP of 43 mmHg on echo performed 12/6/2022, moderate 70% mid LAD lesion, pulmonary artery pressure of 35 to 24 mmHg, pulmonary wedge pressure of 24 mmHg, LVEDP of 14 mmHg, severe mitral stenosis and cardiac catheterization performed 4/11/2023, status post Mitral valve replacement #29 Epic, CABG LIMA to LAD, PFO closure, LA atrial appendage atrial clip, prosthetic #29 epic mitral valve with gradients of 16.4/7.8 mmHg on echo performed 11/29/2023, average heart rate 74 bpm, single episode of nonsustained ventricular tachycardia lasting 3 beats, 33 episodes of paroxysmal supraventricular tachycardia up to 15 beats on event monitor performed March 2024, low normal LVEF of 53% with grade 1 diastolic dysfunction, mildly reduced right ventricular function, moderately dilated left atrial enlargement, #29 epic bioprosthetic mitral valve with a gradient of 20 over 7 mmHg, moderate mitral annular calcification, mild to moderate tricuspid regurgitation on echocardiogram performed 4/8/2025 presents to cardiology clinic for follow-up. She is doing relatively well.  She recently downsized to an  apartment and [place] the [place].  She is enjoying the change of pace.  She does note a fall over the winter with some musculoskeletal chest discomfort that has resolved.  We reviewed and discussed her recent echocardiogram.  Dyspnea on exertion is stable.     Please continue current cardiac medications including aspirin 81 mg, metoprolol succinate 25 mg daily, rosuvastatin 40 mg daily.      We will obtain a transthoracic echocardiogram for structural evaluation including ejection fraction, assessment of regional wall motion abnormalities or valvular disease, and further evaluation of hemodynamics prior to your follow-up visit.    Please followup with me in Cardiology clinic within the next 10-12 months.  Please return to clinic sooner or seek emergent care if your symptoms reoccur or worsen.    Thank you for allowing me to participate in their care.  Please feel free to call me with any further questions or concerns.        Ben Martinez MD, FACC, [facility]  Division of Cardiovascular Medicine  System Director, Nuclear Cardiology   Medical Director, [facility] [place], [facility]   Clinical , [facility] [employer]  [email or web address]   Office:  [phone]

## 2025-04-22 ENCOUNTER — APPOINTMENT (OUTPATIENT)
Dept: PRIMARY CARE | Facility: CLINIC | Age: 70
End: 2025-04-22
Payer: MEDICARE

## 2025-04-22 VITALS
BODY MASS INDEX: 32.21 KG/M2 | HEART RATE: 58 BPM | HEIGHT: 63 IN | WEIGHT: 181.8 LBS | DIASTOLIC BLOOD PRESSURE: 74 MMHG | OXYGEN SATURATION: 99 % | SYSTOLIC BLOOD PRESSURE: 132 MMHG

## 2025-04-22 DIAGNOSIS — E78.5 DYSLIPIDEMIA: ICD-10-CM

## 2025-04-22 DIAGNOSIS — Z13.21 ENCOUNTER FOR VITAMIN DEFICIENCY SCREENING: ICD-10-CM

## 2025-04-22 DIAGNOSIS — E55.9 VITAMIN D DEFICIENCY: ICD-10-CM

## 2025-04-22 DIAGNOSIS — R91.1 LUNG NODULE: ICD-10-CM

## 2025-04-22 DIAGNOSIS — Z13.6 ENCOUNTER FOR LIPID SCREENING FOR CARDIOVASCULAR DISEASE: ICD-10-CM

## 2025-04-22 DIAGNOSIS — Z79.899 ON LONG TERM DRUG THERAPY: ICD-10-CM

## 2025-04-22 DIAGNOSIS — L72.11 PILAR CYST OF SCALP: ICD-10-CM

## 2025-04-22 DIAGNOSIS — E03.9 HYPOTHYROIDISM, UNSPECIFIED TYPE: ICD-10-CM

## 2025-04-22 DIAGNOSIS — Z00.00 ROUTINE GENERAL MEDICAL EXAMINATION AT HEALTH CARE FACILITY: ICD-10-CM

## 2025-04-22 DIAGNOSIS — Z13.220 ENCOUNTER FOR LIPID SCREENING FOR CARDIOVASCULAR DISEASE: ICD-10-CM

## 2025-04-22 DIAGNOSIS — I10 ESSENTIAL HYPERTENSION: ICD-10-CM

## 2025-04-22 DIAGNOSIS — Z13.29 SCREENING FOR THYROID DISORDER: ICD-10-CM

## 2025-04-22 DIAGNOSIS — I25.10 CORONARY ARTERY DISEASE INVOLVING NATIVE CORONARY ARTERY OF NATIVE HEART WITHOUT ANGINA PECTORIS: ICD-10-CM

## 2025-04-22 DIAGNOSIS — Z13.220 SCREENING FOR CHOLESTEROL LEVEL: ICD-10-CM

## 2025-04-22 DIAGNOSIS — R73.09 BLOOD GLUCOSE ABNORMAL: ICD-10-CM

## 2025-04-22 DIAGNOSIS — Z13.1 SCREENING FOR DIABETES MELLITUS: ICD-10-CM

## 2025-04-22 DIAGNOSIS — Z00.00 ROUTINE ADULT HEALTH MAINTENANCE: Primary | ICD-10-CM

## 2025-04-22 DIAGNOSIS — J44.9 CHRONIC OBSTRUCTIVE PULMONARY DISEASE, UNSPECIFIED COPD TYPE (MULTI): ICD-10-CM

## 2025-04-22 PROCEDURE — 1123F ACP DISCUSS/DSCN MKR DOCD: CPT | Performed by: NURSE PRACTITIONER

## 2025-04-22 PROCEDURE — 1158F ADVNC CARE PLAN TLK DOCD: CPT | Performed by: NURSE PRACTITIONER

## 2025-04-22 PROCEDURE — 99214 OFFICE O/P EST MOD 30 MIN: CPT | Performed by: NURSE PRACTITIONER

## 2025-04-22 PROCEDURE — G0439 PPPS, SUBSEQ VISIT: HCPCS | Performed by: NURSE PRACTITIONER

## 2025-04-22 PROCEDURE — 1159F MED LIST DOCD IN RCRD: CPT | Performed by: NURSE PRACTITIONER

## 2025-04-22 PROCEDURE — 3008F BODY MASS INDEX DOCD: CPT | Performed by: NURSE PRACTITIONER

## 2025-04-22 PROCEDURE — 1160F RVW MEDS BY RX/DR IN RCRD: CPT | Performed by: NURSE PRACTITIONER

## 2025-04-22 PROCEDURE — 3078F DIAST BP <80 MM HG: CPT | Performed by: NURSE PRACTITIONER

## 2025-04-22 PROCEDURE — 1170F FXNL STATUS ASSESSED: CPT | Performed by: NURSE PRACTITIONER

## 2025-04-22 PROCEDURE — 3075F SYST BP GE 130 - 139MM HG: CPT | Performed by: NURSE PRACTITIONER

## 2025-04-22 RX ORDER — LEVOTHYROXINE SODIUM 25 UG/1
25 TABLET ORAL DAILY
Qty: 90 TABLET | Refills: 3 | Status: SHIPPED | OUTPATIENT
Start: 2025-04-22

## 2025-04-22 ASSESSMENT — ENCOUNTER SYMPTOMS
ABDOMINAL DISTENTION: 0
OCCASIONAL FEELINGS OF UNSTEADINESS: 0
SHORTNESS OF BREATH: 0
ABDOMINAL PAIN: 0
EYE PAIN: 0
DEPRESSION: 0
DIZZINESS: 0
MYALGIAS: 0
COLOR CHANGE: 0
NAUSEA: 0
BRUISES/BLEEDS EASILY: 0
TROUBLE SWALLOWING: 0
FEVER: 0
PALPITATIONS: 0
FATIGUE: 0
WOUND: 0
WEAKNESS: 0
CONSTIPATION: 0
DIARRHEA: 0
JOINT SWELLING: 0
SEIZURES: 0
DIFFICULTY URINATING: 0
CHILLS: 0
HEADACHES: 0
BACK PAIN: 0
COUGH: 0
ADENOPATHY: 0
WHEEZING: 0
LOSS OF SENSATION IN FEET: 0

## 2025-04-22 ASSESSMENT — PATIENT HEALTH QUESTIONNAIRE - PHQ9
2. FEELING DOWN, DEPRESSED OR HOPELESS: NOT AT ALL
SUM OF ALL RESPONSES TO PHQ9 QUESTIONS 1 AND 2: 0
1. LITTLE INTEREST OR PLEASURE IN DOING THINGS: NOT AT ALL

## 2025-04-22 ASSESSMENT — ACTIVITIES OF DAILY LIVING (ADL)
GROCERY_SHOPPING: INDEPENDENT
MANAGING_FINANCES: INDEPENDENT
DRESSING: INDEPENDENT
TAKING_MEDICATION: INDEPENDENT
BATHING: INDEPENDENT
DOING_HOUSEWORK: INDEPENDENT

## 2025-04-22 ASSESSMENT — COLUMBIA-SUICIDE SEVERITY RATING SCALE - C-SSRS
1. IN THE PAST MONTH, HAVE YOU WISHED YOU WERE DEAD OR WISHED YOU COULD GO TO SLEEP AND NOT WAKE UP?: NO
6. HAVE YOU EVER DONE ANYTHING, STARTED TO DO ANYTHING, OR PREPARED TO DO ANYTHING TO END YOUR LIFE?: NO
2. HAVE YOU ACTUALLY HAD ANY THOUGHTS OF KILLING YOURSELF?: NO

## 2025-04-22 NOTE — ASSESSMENT & PLAN NOTE
Routine blood work ordered today for surveillance purposes.  Will continue to monitor as appropriate  -Patient to maintain annual mammograms in January  -Most recent bone density scan completed in October, 2024 and showed osteopenia.  -Most recent colonoscopy completed in 2022 with recommendations to complete additional screening in 5 years for surveillance purposes (2027)

## 2025-04-22 NOTE — ASSESSMENT & PLAN NOTE
"Most recent imaging of the chest completed in October, 2024 and showed \"1.  Few small bilateral noncalcified pulmonary nodules measuring up to 6 mm, likely benign. Continued screening with low-dose noncontrast chest CT in 12 months (from current date) is recommended. 2. Median sternotomy and post CABG changes.\".  Given patient's smoking history, will plan on repeat CT scan in October, 2025 for surveillance purposes  "

## 2025-04-22 NOTE — PROGRESS NOTES
Subjective   Patient ID: Elena Patrick is a 69 y.o. female who presents for Medicare Annual Wellness Visit Subsequent.    Patient seen today in order to establish primary care as well as complete an annual Medicare wellness exam.  Patient is retired and lives with her spouse who has dementia.  [place] patient's elderly mother and to have a better support system overall.  Patient reports staying active in the day-to-day as well as walking daily.  No reported issues with shortness of breath or chest pain upon exertion.  Discussed with patient cardiac surgery from 2023.  She maintains routine follow-up with cardiology for routine surveillance purposes.  No reported issues with chest pain, palpitations, headaches, blurred vision or other cardiac concerns.  She reportedly monitors her blood pressures routinely at home and states that values are well-controlled.  No reported issues with appetite, staying hydrated, bowel or bladder.  Patient voices concerns about developing diabetes due to strong family history and occasional abnormal blood glucose reading.  Discussed previous blood work as well as intentions of having updated screenings performed.  She reports trying to follow a low sugar diet as well.  Back pain is well-controlled with TENS unit.  Patient reports some increased stressors secondary to her 's cognitive impairment but denies any specific issues with anxiety or depression.  Good support system reported overall.  No significant issues with insomnia.  Patient denies any acute concerns with vision or dentition.  Medications reviewed.  No other acute concerns voiced at this time.         Medications Ordered Prior to Encounter[1]    Medical History[2]     Surgical History[3]     Family History[4]     Review of Systems   Constitutional:  Negative for chills, fatigue and fever.   HENT:  Positive for hearing loss. Negative for dental problem and trouble  "swallowing.         Cochlear implant in left ear and deaf in right ear   Eyes:  Negative for pain and visual disturbance.        Wears glasses   Respiratory:  Negative for cough, shortness of breath and wheezing.    Cardiovascular:  Positive for leg swelling. Negative for chest pain and palpitations.        Reports minimal left leg edema following hip replacement surgery   Gastrointestinal:  Negative for abdominal distention, abdominal pain, constipation, diarrhea and nausea.   Endocrine: Negative for cold intolerance and heat intolerance.   Genitourinary:  Negative for difficulty urinating.   Musculoskeletal:  Negative for back pain, gait problem, joint swelling and myalgias.   Skin:  Negative for color change, pallor, rash and wound.   Allergic/Immunologic: Negative for environmental allergies and food allergies.   Neurological:  Negative for dizziness, seizures, weakness and headaches.   Hematological:  Negative for adenopathy. Does not bruise/bleed easily.   Psychiatric/Behavioral:  Negative for behavioral problems.         Reports increased stressor   All other systems reviewed and are negative.      Objective   /74   Pulse 58   Ht 1.6 m (5' 3\")   Wt 82.5 kg (181 lb 12.8 oz)   SpO2 99%   BMI 32.20 kg/m²     Physical Exam  Constitutional:       General: She is not in acute distress.     Appearance: Normal appearance. She is not toxic-appearing.   HENT:      Head: Normocephalic and atraumatic.      Right Ear: Tympanic membrane, ear canal and external ear normal.      Left Ear: Tympanic membrane, ear canal and external ear normal.      Nose: Nose normal.      Mouth/Throat:      Mouth: Mucous membranes are moist.      Pharynx: Oropharynx is clear.   Eyes:      Extraocular Movements: Extraocular movements intact.      Conjunctiva/sclera: Conjunctivae normal.      Pupils: Pupils are equal, round, and reactive to light.   Cardiovascular:      Rate and Rhythm: Normal rate and regular rhythm.      Pulses: " Normal pulses.      Heart sounds: Normal heart sounds. No murmur heard.  Pulmonary:      Effort: Pulmonary effort is normal.      Breath sounds: Normal breath sounds. No wheezing.   Abdominal:      General: Bowel sounds are normal.      Palpations: Abdomen is soft.   Musculoskeletal:         General: No swelling. Normal range of motion.      Cervical back: Normal range of motion and neck supple.   Skin:     General: Skin is warm and dry.   Neurological:      General: No focal deficit present.      Mental Status: She is alert and oriented to person, place, and time. Mental status is at baseline.      Cranial Nerves: No cranial nerve deficit.      Motor: No weakness.   Psychiatric:         Mood and Affect: Mood normal.         Behavior: Behavior normal.         Thought Content: Thought content normal.         Judgment: Judgment normal.         Assessment/Plan   Problem List Items Addressed This Visit           ICD-10-CM    Chronic obstructive pulmonary disease (Multi) J44.9    Reportedly stable.  Consider adding as needed albuterol.  Provider to be notified for any new/worsening pulmonary concerns         Relevant Orders    Comprehensive Metabolic Panel (Completed)    CBC and Auto Differential (Completed)    Dyslipidemia E78.5    Patient continues on daily statin.  Will continue to monitor fasting cholesterol level routinely for surveillance purposes         Essential hypertension I10    Patient to continue current metoprolol dosing.  She is to continue to monitor her vital signs routinely at home and notify provider for any persistent elevations or new cardiac concerns.  Routine follow-up with cardiology to be maintained for continued evaluation and treatment recommendations         Hypothyroidism E03.9    Lab Results   Component Value Date    TSH 1.97 04/22/2025     Patient to continue current levothyroxine dosing.  Will continue to monitor TSH levels routinely for surveillance purposes         Relevant Medications  "   levothyroxine (Synthroid, Levoxyl) 25 mcg tablet    Lung nodule R91.1    Most recent imaging of the chest completed in October, 2024 and showed \"1.  Few small bilateral noncalcified pulmonary nodules measuring up to 6 mm, likely benign. Continued screening with low-dose noncontrast chest CT in 12 months (from current date) is recommended. 2. Median sternotomy and post CABG changes.\".  Given patient's smoking history, will plan on repeat CT scan in October, 2025 for surveillance purposes         Vitamin D deficiency E55.9    Relevant Orders    Vitamin D 25-Hydroxy,Total (for eval of Vitamin D levels) (Completed)    Routine adult health maintenance - Primary Z00.00    Routine blood work ordered today for surveillance purposes.  Will continue to monitor as appropriate  -Patient to maintain annual mammograms in January  -Most recent bone density scan completed in October, 2024 and showed osteopenia.  -Most recent colonoscopy completed in 2022 with recommendations to complete additional screening in 5 years for surveillance purposes (2027)         Relevant Orders    Comprehensive Metabolic Panel (Completed)    TSH with reflex to Free T4 if abnormal (Completed)    Lipid Panel (Completed)    CBC and Auto Differential (Completed)    Coronary artery disease involving native coronary artery of native heart without angina pectoris I25.10    On 4/11/2023, patient underwent a mitral valve replacement, CABG LIMA to LAD, PFO closure and LA atrial appendage atrial clip.  Routine follow-up with cardiology to be maintained.  Patient to continue on daily aspirin and statin therapies.  Provider to be notified for any new/worsening cardiac concerns          Other Visit Diagnoses         Codes      Screening for diabetes mellitus     Z13.1    Relevant Orders    Hemoglobin A1C (Completed)      Blood glucose abnormal     R73.09    Relevant Orders    Hemoglobin A1C (Completed)      Screening for thyroid disorder     Z13.29    Relevant " Orders    TSH with reflex to Free T4 if abnormal (Completed)      Screening for cholesterol level     Z13.220    Relevant Orders    Lipid Panel (Completed)      Encounter for lipid screening for cardiovascular disease     Z13.220, Z13.6    Relevant Orders    Lipid Panel (Completed)      Encounter for vitamin deficiency screening     Z13.21    Relevant Orders    Vitamin D 25-Hydroxy,Total (for eval of Vitamin D levels) (Completed)      On long term drug therapy     Z79.899    Relevant Orders    Vitamin D 25-Hydroxy,Total (for eval of Vitamin D levels) (Completed)      Pilar cyst of scalp     L72.11    Relevant Orders    Referral to Dermatology                    [1]   Current Outpatient Medications on File Prior to Visit   Medication Sig Dispense Refill    aspirin (Aspirin Childrens) 81 mg chewable tablet Chew 1 tablet (81 mg) once daily.      calcipotriene (Dovonex) 0.005 % ointment Apply to affected areas twice daily for psoriasis when active as needed. 60 g 5    clobetasol (Temovate) 0.05 % ointment PLEASE SEE ATTACHED FOR DETAILED DIRECTIONS      metoprolol succinate XL (Toprol-XL) 25 mg 24 hr tablet Take 1 tablet (25 mg) by mouth once daily. 90 tablet 3    MULTIVITAMIN ORAL Take 1 tablet by mouth once daily.      rosuvastatin (Crestor) 40 mg tablet Take 1 tablet (40 mg) by mouth once daily. 90 tablet 3    triamcinolone (Kenalog) 0.1 % cream Apply to affected area 1-2 times daily as needed. 15 g 0    [DISCONTINUED] levothyroxine (Synthroid, Levoxyl) 25 mcg tablet Take 1 tablet (25 mcg) by mouth once daily. 90 tablet 3     No current facility-administered medications on file prior to visit.   [2]   Past Medical History:  Diagnosis Date    Abnormal ECG 3/18/24    Acute on chronic respiratory failure with hypoxia 02/16/2023    CHF (congestive heart failure)     Disease of thyroid gland     Heart disease     HL (hearing loss)     Nicotine dependence 04/17/2024    Personal history of (healed) traumatic fracture  09/23/2021    History of fracture of left hip    Personal history of other diseases of the musculoskeletal system and connective tissue 11/01/2019    History of arthritis    Personal history of other endocrine, nutritional and metabolic disease 11/01/2019    History of thyroid disorder    Personal history of other infectious and parasitic diseases 11/01/2019    History of herpes simplex infection   [3]   Past Surgical History:  Procedure Laterality Date    BREAST BIOPSY Right 01/01/2018    rt [id number]    CARDIAC CATHETERIZATION  4/2023    CARDIAC SURGERY  5/1/2023    CARDIAC VALVE REPLACEMENT  5/1/2023    CORONARY ARTERY BYPASS GRAFT  5/1/2023    OTHER SURGICAL HISTORY  11/01/2019    Cochlear implant surgery   [4]   Family History  Problem Relation Name Age of Onset    Diabetes Brother Luke Go     Diabetes type II Brother Luke Go     Thyroid disease Mother Adiel Go     Heart murmur Mother Adiel Go     Heart failure Paternal Grandfather Sameer

## 2025-04-22 NOTE — PATIENT INSTRUCTIONS
Please arrange for routine follow up in 6 months. You may schedule on-line through [employer] or call our office at [phone].      Orders are in place for you to have fasting blood work completed. Please do not eat or drink 8 hours prior to having your blood drawn.   You may have your blood work completed in this building through [employer] ([address], [address], [place] [address]).  For this location, you may schedule an appointment online or drop-in for walk-in services. [email or web address]  Hours:   Monday - Friday: 7:30 a.m. - 5 p.m. and Saturday: 8 a.m. - 11 a.m.  Phone:  [phone]       Please arrange for follow-up with dermatology. You may schedule with a  dermatologist or one of the providers at a local private dermatology practice  -[facility]  [address], [address]  [place] [address]  Phone: [phone]  Fax: [phone]  -[facility]  [address] [place] [address]  Phone: [phone]

## 2025-04-23 LAB
25(OH)D3+25(OH)D2 SERPL-MCNC: 33 NG/ML (ref 30–100)
ALBUMIN SERPL-MCNC: 4.6 G/DL (ref 3.6–5.1)
ALP SERPL-CCNC: 53 U/L (ref 37–153)
ALT SERPL-CCNC: 20 U/L (ref 6–29)
ANION GAP SERPL CALCULATED.4IONS-SCNC: 13 MMOL/L (CALC) (ref 7–17)
AST SERPL-CCNC: 21 U/L (ref 10–35)
BASOPHILS # BLD AUTO: 39 CELLS/UL (ref 0–200)
BASOPHILS NFR BLD AUTO: 0.7 %
BILIRUB SERPL-MCNC: 0.7 MG/DL (ref 0.2–1.2)
BUN SERPL-MCNC: 12 MG/DL (ref 7–25)
CALCIUM SERPL-MCNC: 9.6 MG/DL (ref 8.6–10.4)
CHLORIDE SERPL-SCNC: 102 MMOL/L (ref 98–110)
CHOLEST SERPL-MCNC: 112 MG/DL
CHOLEST/HDLC SERPL: 2 (CALC)
CO2 SERPL-SCNC: 25 MMOL/L (ref 20–32)
CREAT SERPL-MCNC: 0.78 MG/DL (ref 0.6–1)
EGFRCR SERPLBLD CKD-EPI 2021: 81 ML/MIN/1.73M2
EOSINOPHIL # BLD AUTO: 83 CELLS/UL (ref 15–500)
EOSINOPHIL NFR BLD AUTO: 1.5 %
ERYTHROCYTE [DISTWIDTH] IN BLOOD BY AUTOMATED COUNT: 13.6 % (ref 11–15)
EST. AVERAGE GLUCOSE BLD GHB EST-MCNC: 131 MG/DL
EST. AVERAGE GLUCOSE BLD GHB EST-SCNC: 7.3 MMOL/L
GLUCOSE SERPL-MCNC: 115 MG/DL (ref 65–99)
HBA1C MFR BLD: 6.2 %
HCT VFR BLD AUTO: 40 % (ref 35–45)
HDLC SERPL-MCNC: 56 MG/DL
HGB BLD-MCNC: 12.9 G/DL (ref 11.7–15.5)
LDLC SERPL CALC-MCNC: 33 MG/DL (CALC)
LYMPHOCYTES # BLD AUTO: 1441 CELLS/UL (ref 850–3900)
LYMPHOCYTES NFR BLD AUTO: 26.2 %
MCH RBC QN AUTO: 30.1 PG (ref 27–33)
MCHC RBC AUTO-ENTMCNC: 32.3 G/DL (ref 32–36)
MCV RBC AUTO: 93.5 FL (ref 80–100)
MONOCYTES # BLD AUTO: 523 CELLS/UL (ref 200–950)
MONOCYTES NFR BLD AUTO: 9.5 %
NEUTROPHILS # BLD AUTO: 3416 CELLS/UL (ref 1500–7800)
NEUTROPHILS NFR BLD AUTO: 62.1 %
NONHDLC SERPL-MCNC: 56 MG/DL (CALC)
PLATELET # BLD AUTO: 169 THOUSAND/UL (ref 140–400)
PMV BLD REES-ECKER: 11.7 FL (ref 7.5–12.5)
POTASSIUM SERPL-SCNC: 3.9 MMOL/L (ref 3.5–5.3)
PROT SERPL-MCNC: 7.4 G/DL (ref 6.1–8.1)
RBC # BLD AUTO: 4.28 MILLION/UL (ref 3.8–5.1)
SODIUM SERPL-SCNC: 140 MMOL/L (ref 135–146)
TRIGL SERPL-MCNC: 148 MG/DL
TSH SERPL-ACNC: 1.97 MIU/L (ref 0.4–4.5)
WBC # BLD AUTO: 5.5 THOUSAND/UL (ref 3.8–10.8)

## 2025-04-24 NOTE — ASSESSMENT & PLAN NOTE
On 4/11/2023, patient underwent a mitral valve replacement, CABG LIMA to LAD, PFO closure and LA atrial appendage atrial clip.  Routine follow-up with cardiology to be maintained.  Patient to continue on daily aspirin and statin therapies.  Provider to be notified for any new/worsening cardiac concerns

## 2025-04-24 NOTE — ASSESSMENT & PLAN NOTE
Patient continues on daily statin.  Will continue to monitor fasting cholesterol level routinely for surveillance purposes

## 2025-04-24 NOTE — ASSESSMENT & PLAN NOTE
Lab Results   Component Value Date    TSH 1.97 04/22/2025     Patient to continue current levothyroxine dosing.  Will continue to monitor TSH levels routinely for surveillance purposes

## 2025-04-24 NOTE — ASSESSMENT & PLAN NOTE
Reportedly stable.  Consider adding as needed albuterol.  Provider to be notified for any new/worsening pulmonary concerns

## 2025-04-24 NOTE — ASSESSMENT & PLAN NOTE
Patient to continue current metoprolol dosing.  She is to continue to monitor her vital signs routinely at home and notify provider for any persistent elevations or new cardiac concerns.  Routine follow-up with cardiology to be maintained for continued evaluation and treatment recommendations